# Patient Record
Sex: FEMALE | Race: WHITE | HISPANIC OR LATINO | Employment: UNEMPLOYED | URBAN - METROPOLITAN AREA
[De-identification: names, ages, dates, MRNs, and addresses within clinical notes are randomized per-mention and may not be internally consistent; named-entity substitution may affect disease eponyms.]

---

## 2017-07-27 ENCOUNTER — HOSPITAL ENCOUNTER (EMERGENCY)
Facility: HOSPITAL | Age: 32
Discharge: HOME | End: 2017-07-27
Attending: FAMILY MEDICINE
Payer: MEDICAID

## 2017-07-27 VITALS
DIASTOLIC BLOOD PRESSURE: 78 MMHG | HEART RATE: 87 BPM | HEIGHT: 59 IN | RESPIRATION RATE: 18 BRPM | SYSTOLIC BLOOD PRESSURE: 130 MMHG | TEMPERATURE: 98 F | WEIGHT: 128.06 LBS | OXYGEN SATURATION: 98 % | BODY MASS INDEX: 25.82 KG/M2

## 2017-07-27 DIAGNOSIS — O20.9 VAGINAL BLEEDING IN PREGNANCY, FIRST TRIMESTER: Primary | ICD-10-CM

## 2017-07-27 DIAGNOSIS — N39.0 URINARY TRACT INFECTION WITH HEMATURIA, SITE UNSPECIFIED: ICD-10-CM

## 2017-07-27 DIAGNOSIS — O20.0 THREATENED ABORTION: ICD-10-CM

## 2017-07-27 DIAGNOSIS — O23.41 UTI (URINARY TRACT INFECTION) IN PREGNANCY IN FIRST TRIMESTER: ICD-10-CM

## 2017-07-27 DIAGNOSIS — R31.9 URINARY TRACT INFECTION WITH HEMATURIA, SITE UNSPECIFIED: ICD-10-CM

## 2017-07-27 LAB
ALBUMIN SERPL BCP-MCNC: 4.2 G/DL
ALP SERPL-CCNC: 99 U/L
ALT SERPL W/O P-5'-P-CCNC: 19 U/L
ANION GAP SERPL CALC-SCNC: 12 MMOL/L
AST SERPL-CCNC: 16 U/L
B-HCG UR QL: POSITIVE
BACTERIA #/AREA URNS AUTO: ABNORMAL /HPF
BACTERIA GENITAL QL WET PREP: ABNORMAL
BASOPHILS # BLD AUTO: 0.02 K/UL
BASOPHILS NFR BLD: 0.2 %
BILIRUB SERPL-MCNC: 0.4 MG/DL
BILIRUB UR QL STRIP: NEGATIVE
BUN SERPL-MCNC: 8 MG/DL
C TRACH DNA SPEC QL NAA+PROBE: NOT DETECTED
CALCIUM SERPL-MCNC: 9.3 MG/DL
CHLORIDE SERPL-SCNC: 102 MMOL/L
CLARITY UR REFRACT.AUTO: ABNORMAL
CLUE CELLS VAG QL WET PREP: ABNORMAL
CO2 SERPL-SCNC: 21 MMOL/L
COLOR UR AUTO: YELLOW
CREAT SERPL-MCNC: 0.7 MG/DL
CTP QC/QA: YES
DIFFERENTIAL METHOD: ABNORMAL
EOSINOPHIL # BLD AUTO: 0 K/UL
EOSINOPHIL NFR BLD: 0.3 %
ERYTHROCYTE [DISTWIDTH] IN BLOOD BY AUTOMATED COUNT: 14.1 %
EST. GFR  (AFRICAN AMERICAN): >60 ML/MIN/1.73 M^2
EST. GFR  (NON AFRICAN AMERICAN): >60 ML/MIN/1.73 M^2
FILAMENT FUNGI VAG WET PREP-#/AREA: ABNORMAL
GLUCOSE SERPL-MCNC: 96 MG/DL
GLUCOSE UR QL STRIP: NEGATIVE
HCG INTACT+B SERPL-ACNC: 2888 MIU/ML
HCT VFR BLD AUTO: 41.2 %
HGB BLD-MCNC: 13.7 G/DL
HGB UR QL STRIP: ABNORMAL
KETONES UR QL STRIP: NEGATIVE
LEUKOCYTE ESTERASE UR QL STRIP: ABNORMAL
LYMPHOCYTES # BLD AUTO: 1.9 K/UL
LYMPHOCYTES NFR BLD: 14.3 %
MCH RBC QN AUTO: 26.6 PG
MCHC RBC AUTO-ENTMCNC: 33.3 G/DL
MCV RBC AUTO: 80 FL
MICROSCOPIC COMMENT: ABNORMAL
MONOCYTES # BLD AUTO: 0.6 K/UL
MONOCYTES NFR BLD: 4.9 %
N GONORRHOEA DNA SPEC QL NAA+PROBE: NOT DETECTED
NEUTROPHILS # BLD AUTO: 10.6 K/UL
NEUTROPHILS NFR BLD: 80 %
NITRITE UR QL STRIP: NEGATIVE
PH UR STRIP: 5 [PH] (ref 5–8)
PLATELET # BLD AUTO: 326 K/UL
PMV BLD AUTO: 11 FL
POTASSIUM SERPL-SCNC: 3.5 MMOL/L
PROT SERPL-MCNC: 8.4 G/DL
PROT UR QL STRIP: NEGATIVE
RBC # BLD AUTO: 5.15 M/UL
RBC #/AREA URNS AUTO: 4 /HPF (ref 0–4)
SODIUM SERPL-SCNC: 135 MMOL/L
SP GR UR STRIP: 1.01 (ref 1–1.03)
SPECIMEN SOURCE: ABNORMAL
SQUAMOUS #/AREA URNS AUTO: 5 /HPF
T VAGINALIS GENITAL QL WET PREP: ABNORMAL
URN SPEC COLLECT METH UR: ABNORMAL
UROBILINOGEN UR STRIP-ACNC: NEGATIVE EU/DL
WBC # BLD AUTO: 13.19 K/UL
WBC #/AREA URNS AUTO: 37 /HPF (ref 0–5)
WBC #/AREA VAG WET PREP: ABNORMAL
YEAST GENITAL QL WET PREP: ABNORMAL

## 2017-07-27 PROCEDURE — 87210 SMEAR WET MOUNT SALINE/INK: CPT

## 2017-07-27 PROCEDURE — 81001 URINALYSIS AUTO W/SCOPE: CPT

## 2017-07-27 PROCEDURE — 99284 EMERGENCY DEPT VISIT MOD MDM: CPT | Mod: ,,, | Performed by: PHYSICIAN ASSISTANT

## 2017-07-27 PROCEDURE — 85025 COMPLETE CBC W/AUTO DIFF WBC: CPT

## 2017-07-27 PROCEDURE — 81025 URINE PREGNANCY TEST: CPT | Performed by: PHYSICIAN ASSISTANT

## 2017-07-27 PROCEDURE — 96360 HYDRATION IV INFUSION INIT: CPT

## 2017-07-27 PROCEDURE — 80053 COMPREHEN METABOLIC PANEL: CPT

## 2017-07-27 PROCEDURE — 25000003 PHARM REV CODE 250: Performed by: PHYSICIAN ASSISTANT

## 2017-07-27 PROCEDURE — 99284 EMERGENCY DEPT VISIT MOD MDM: CPT | Mod: 25

## 2017-07-27 PROCEDURE — 84702 CHORIONIC GONADOTROPIN TEST: CPT

## 2017-07-27 PROCEDURE — 96361 HYDRATE IV INFUSION ADD-ON: CPT

## 2017-07-27 PROCEDURE — 87591 N.GONORRHOEAE DNA AMP PROB: CPT

## 2017-07-27 PROCEDURE — 87086 URINE CULTURE/COLONY COUNT: CPT

## 2017-07-27 RX ORDER — NITROFURANTOIN 25; 75 MG/1; MG/1
100 CAPSULE ORAL
Status: COMPLETED | OUTPATIENT
Start: 2017-07-27 | End: 2017-07-27

## 2017-07-27 RX ORDER — ACETAMINOPHEN 325 MG/1
650 TABLET ORAL
Status: COMPLETED | OUTPATIENT
Start: 2017-07-27 | End: 2017-07-27

## 2017-07-27 RX ORDER — NITROFURANTOIN 25; 75 MG/1; MG/1
100 CAPSULE ORAL 2 TIMES DAILY
Qty: 14 CAPSULE | Refills: 0 | Status: SHIPPED | OUTPATIENT
Start: 2017-07-27 | End: 2017-08-03

## 2017-07-27 RX ADMIN — NITROFURANTOIN (MONOHYDRATE/MACROCRYSTALS) 100 MG: 75; 25 CAPSULE ORAL at 02:07

## 2017-07-27 RX ADMIN — SODIUM CHLORIDE 1000 ML: 0.9 INJECTION, SOLUTION INTRAVENOUS at 12:07

## 2017-07-27 RX ADMIN — ACETAMINOPHEN 650 MG: 325 TABLET ORAL at 12:07

## 2017-07-27 NOTE — DISCHARGE INSTRUCTIONS
Follow up with a doctor in 2 days (7/29/17) for a repeat pregnancy hormone level. Your test shows that you are pregnant. Bleeding in early pregnancy can be normal or it can be a sign of miscarriage. It is important that you follow up with your OBGYN in 2 days for further evaluation. Return to the ER right away if you have fever, severe pain, severe bleeding (more than 1 pad every hour), lightheadedness, dizziness or any new or concerning symptoms.    Above in Stateless Translation: Seguimiento con un médico en 2 días (7/29/17) para un nivel de repetición de la hormona del embarazo. Ash prueba muestra que está embarazada. El sangrado en el embarazo temprano puede ser normal o puede ser un signo de aborto. Es importante que realice el seguimiento con ash OBGYN en 2 días para rome evaluación posterior. Vuelva al ER de inmediato si tiene fiebre, dolor intenso, sangrado severo (más de 1 almohadilla cada hora), aturdimiento, mareos o cualquier síntoma nuevo o relacionado.

## 2017-07-27 NOTE — ED TRIAGE NOTES
Jazlyn Martínez, a 31 y.o. female presents to the ED c/o vaginal bleeding since yesterday and + UPT.       Chief Complaint   Patient presents with    Vaginal Bleeding     Pt reports vaginal bleeding that began yesterday.  (+) cramping.  LMP 6/12, had (+) UPT     Review of patient's allergies indicates:  No Known Allergies  History reviewed. No pertinent past medical history.    LOC: Patient name and date of birth verified. The patient is awake, alert and aware of environment with an appropriate affect, the patient is oriented x 3 and speaking appropriately.   APPEARANCE: Patient resting comfortably, patient is clean and well groomed, patient's clothing is properly fastened.  SKIN: The skin is warm and dry, color consistent with ethnicity, patient has normal skin turgor and moist mucus membranes, skin intact, no breakdown or bruising noted.  MUSCULOSKELETAL: Patient moving all extremities well, no obvious swelling or deformities noted.   RESPIRATORY: Respirations are spontaneous, patient has a normal effort and rate, no accessory muscle use noted.  CARDIAC: Patient has a normal rate and rhythm, no periphreal edema noted, capillary refill < 3 seconds.  ABDOMEN: Soft and non tender to palpation, no distention noted. Bowel sounds present in all four quadrants.  NEUROLOGIC: Eyes open spontaneously, behavior appropriate to situation, follows commands, facial expression symmetrical, bilateral hand grasp equal and even, purposeful motor response noted, normal sensation in all extremities when touched with a finger.

## 2017-07-27 NOTE — ED PROVIDER NOTES
Encounter Date: 2017       History     Chief Complaint   Patient presents with    Vaginal Bleeding     Pt reports vaginal bleeding that began yesterday.  (+) cramping.  LMP , had (+) UPT     This is a 31 year old  female with no known significant PMH who presents to the ED with a chief complaint of abdominal pain and vaginal bleeding. LMP 17. She reports her cycles as regular. She began with lower abdominal and pelvic pain described as 5/10 and cramping in nature. It radiates to the back. She noted scant vaginal spotting without heavy bleeding or clots. She reports a positive home pregnancy test. Patient denies fever, chills, chest pain, SOB, nausea/vomiting, dysuria or additional complaints.      The history is provided by the patient. The history is limited by a language barrier. A  was used.     Review of patient's allergies indicates:  No Known Allergies  History reviewed. No pertinent past medical history.  History reviewed. No pertinent surgical history.  History reviewed. No pertinent family history.  Social History   Substance Use Topics    Smoking status: Not on file    Smokeless tobacco: Not on file    Alcohol use Not on file     Review of Systems   Constitutional: Negative for chills and fever.   HENT: Negative for sore throat.    Respiratory: Negative for shortness of breath.    Cardiovascular: Negative for chest pain.   Gastrointestinal: Negative for abdominal pain, nausea and vomiting.   Genitourinary: Positive for menstrual problem, pelvic pain and vaginal bleeding. Negative for dysuria.   Musculoskeletal: Positive for back pain.   Skin: Negative for rash.   Neurological: Negative for weakness.   Hematological: Does not bruise/bleed easily.       Physical Exam     Initial Vitals [17 1032]   BP Pulse Resp Temp SpO2   128/81 93 16 97.2 °F (36.2 °C) --      MAP       96.67         Physical Exam    Constitutional: She appears well-developed and  well-nourished. No distress.   HENT:   Head: Atraumatic.   Eyes: Conjunctivae and EOM are normal. Pupils are equal, round, and reactive to light.   Cardiovascular: Normal rate, regular rhythm and normal heart sounds.   Pulmonary/Chest: Breath sounds normal. No respiratory distress. She has no wheezes. She has no rhonchi. She has no rales.   Abdominal: Soft. Bowel sounds are normal. There is tenderness in the suprapubic area. There is no rebound and no guarding.   Genitourinary: Cervix exhibits friability. Cervix exhibits no motion tenderness. Right adnexum displays tenderness. Left adnexum displays tenderness.   Genitourinary Comments: Moderate vaginal bleeding. Cervix is closed.   Neurological: She is alert and oriented to person, place, and time.   Skin: Skin is warm and dry. No rash noted.         ED Course   Procedures  Labs Reviewed   CBC W/ AUTO DIFFERENTIAL - Abnormal; Notable for the following:        Result Value    WBC 13.19 (*)     MCV 80 (*)     MCH 26.6 (*)     Gran # 10.6 (*)     Gran% 80.0 (*)     Lymph% 14.3 (*)     All other components within normal limits   COMPREHENSIVE METABOLIC PANEL - Abnormal; Notable for the following:     Sodium 135 (*)     CO2 21 (*)     All other components within normal limits   URINALYSIS, REFLEX TO URINE CULTURE - Abnormal; Notable for the following:     Appearance, UA Hazy (*)     Occult Blood UA 3+ (*)     Leukocytes, UA 1+ (*)     All other components within normal limits    Narrative:     Preferred Collection Type->Urine, Clean Catch   VAGINAL SCREEN - Abnormal; Notable for the following:     Clue Cells, Wet Prep Few (*)     WBC - Vaginal Screen Rare (*)     Bacteria - Vaginal Screen Many (*)     All other components within normal limits   URINALYSIS MICROSCOPIC - Abnormal; Notable for the following:     WBC, UA 37 (*)     Bacteria, UA Few (*)     All other components within normal limits    Narrative:     Preferred Collection Type->Urine, Clean Catch   POCT URINE  PREGNANCY - Abnormal; Notable for the following:     POC Preg Test, Ur Positive (*)     All other components within normal limits   C. TRACHOMATIS/N. GONORRHOEAE BY AMP DNA   CULTURE, URINE   HCG, QUANTITATIVE, PREGNANCY   GROUP & RH         Imaging Results          US OB Less Than 14 Wks with Transvag(xpd (Final result)  Result time 07/27/17 13:46:36    Final result by Wyatt Patel III, MD (07/27/17 13:46:36)                 Impression:        Single intrauterine pregnancy age 5 weeks, 3 days without complication. Continued ultrasound followup recommended.  ______________________________________     Electronically signed by resident: ANGEL LIMA MD  Date:     07/27/17  Time:    13:33            As the supervising and teaching physician, I personally reviewed the images and resident's interpretation and I agree with the findings.            Electronically signed by: WYATT PATEL  Date:     07/27/17  Time:    13:46              Narrative:    Comparison: None.    Indication: Vaginal bleeding    Technique: Transabdominal and transvaginal sonographic evaluation of pelvic organs was performed using grayscale, color flow and spectral analysis.    Findings:    LMP: 06/12/17    The uterus is enlarged in size measuring 4.5 x 5.4 x 11.0 cm. The endometrium is thickened and a gestational sac is identified measuring 0.89 x 0.76 x 0.55 for a mean sac diameter of 0.75 cm3. The crown-rump length measures 2.63 mm.     A heartbeat is identified measuring 85 beats per minute.    The right ovary is normal in appearance and measures 2.8 x 2.5 x 1.9 cm with a resistive index of 0.83 and normal arterial and venous blood flow.  The left ovary measures 3.0 x 2.5 x 1.2 cm with good blood flow and resistive index of 0.43. No corpus luteum is identified.                                         APC / Resident Notes:   31-year-old Uzbek-speaking female presents with pelvic pain, vaginal bleeding, and a positive home pregnancy  test.  On exam she is afebrile and nontoxic.  Abdomen is soft with mild lower abdominal and suprapubic tenderness.  Pelvic exam demonstrates a moderate amount of vaginal bleeding and a closed cervix.    DDX includes but is not limited to threatened , ectopic pregnancy, dysfunctional uterine bleeding, UTI.    Urine pregnancy test is positive.  Labs demonstrate CBC 13.19, stable H&H 13/41, hCG 2888.  Urinalysis is nitrite negative with 1+ leukocytes, 37 WBCs.  Will start Macrobid pending culture.  OB ultrasound demonstrates a single intrauterine pregnancy dated at 5 weeks and 3 days.  Fetal heart tones 85 bpm.  Utilizing the language line with a professional  I advised the patient of these findings and that they may represent bleeding in early normal pregnancy or an early threatened miscarriage.  I have recommended that the patient follow-up in 2 days for serial hCG.  I have referred her to the OB/GYN clinic and given her detailed return to ED precautions.  The patient verbalizes understanding and all questions were answered to the patient's satisfaction.  Her pain is improved with Tylenol.  She has stable vitals and is stable for outpatient management. I discussed the care of this patient with my supervising MD.               ED Course     Clinical Impression:   The primary encounter diagnosis was Vaginal bleeding in pregnancy, first trimester. Diagnoses of Threatened  and Urinary tract infection with hematuria, site unspecified were also pertinent to this visit.    Disposition:   Disposition: Discharged  Condition: Stable                        EMERITA Walker  17

## 2017-07-28 LAB — BACTERIA UR CULT: NO GROWTH

## 2017-07-29 ENCOUNTER — HOSPITAL ENCOUNTER (EMERGENCY)
Facility: HOSPITAL | Age: 32
Discharge: HOME | End: 2017-07-29
Attending: EMERGENCY MEDICINE
Payer: MEDICAID

## 2017-07-29 VITALS
HEART RATE: 88 BPM | WEIGHT: 127.88 LBS | HEIGHT: 60 IN | RESPIRATION RATE: 20 BRPM | OXYGEN SATURATION: 100 % | TEMPERATURE: 98 F | SYSTOLIC BLOOD PRESSURE: 124 MMHG | DIASTOLIC BLOOD PRESSURE: 68 MMHG | BODY MASS INDEX: 25.11 KG/M2

## 2017-07-29 DIAGNOSIS — O03.9 ABORTION: Primary | ICD-10-CM

## 2017-07-29 DIAGNOSIS — O03.9 COMPLETE ABORTION: ICD-10-CM

## 2017-07-29 DIAGNOSIS — N93.9 VAGINAL BLEEDING: ICD-10-CM

## 2017-07-29 LAB
ABO + RH BLD: NORMAL
BASOPHILS # BLD AUTO: 0.02 K/UL
BASOPHILS NFR BLD: 0.3 %
DIFFERENTIAL METHOD: ABNORMAL
EOSINOPHIL # BLD AUTO: 0.1 K/UL
EOSINOPHIL NFR BLD: 1.3 %
ERYTHROCYTE [DISTWIDTH] IN BLOOD BY AUTOMATED COUNT: 14.2 %
HCG INTACT+B SERPL-ACNC: 359 MIU/ML
HCT VFR BLD AUTO: 38.6 %
HGB BLD-MCNC: 12.9 G/DL
LYMPHOCYTES # BLD AUTO: 2.2 K/UL
LYMPHOCYTES NFR BLD: 28.3 %
MCH RBC QN AUTO: 26.8 PG
MCHC RBC AUTO-ENTMCNC: 33.4 G/DL
MCV RBC AUTO: 80 FL
MONOCYTES # BLD AUTO: 0.6 K/UL
MONOCYTES NFR BLD: 7.5 %
NEUTROPHILS # BLD AUTO: 4.8 K/UL
NEUTROPHILS NFR BLD: 62.5 %
PLATELET # BLD AUTO: 316 K/UL
PMV BLD AUTO: 10.5 FL
RBC # BLD AUTO: 4.82 M/UL
WBC # BLD AUTO: 7.6 K/UL

## 2017-07-29 PROCEDURE — 86901 BLOOD TYPING SEROLOGIC RH(D): CPT

## 2017-07-29 PROCEDURE — 25000003 PHARM REV CODE 250: Performed by: PHYSICIAN ASSISTANT

## 2017-07-29 PROCEDURE — 86900 BLOOD TYPING SEROLOGIC ABO: CPT

## 2017-07-29 PROCEDURE — 99284 EMERGENCY DEPT VISIT MOD MDM: CPT | Mod: 25

## 2017-07-29 PROCEDURE — 85025 COMPLETE CBC W/AUTO DIFF WBC: CPT

## 2017-07-29 PROCEDURE — 84702 CHORIONIC GONADOTROPIN TEST: CPT

## 2017-07-29 PROCEDURE — 99285 EMERGENCY DEPT VISIT HI MDM: CPT | Mod: ,,, | Performed by: EMERGENCY MEDICINE

## 2017-07-29 RX ORDER — ACETAMINOPHEN 325 MG/1
650 TABLET ORAL
Status: COMPLETED | OUTPATIENT
Start: 2017-07-29 | End: 2017-07-29

## 2017-07-29 RX ADMIN — ACETAMINOPHEN 650 MG: 325 TABLET ORAL at 10:07

## 2017-07-29 NOTE — ED TRIAGE NOTES
Seen here 2 days ago with a positive UPT.  Returning today for follow up from here visit 2 days ago.  Patient states that she feels better.

## 2017-07-29 NOTE — PROGRESS NOTES
Called by PA in ED regarding Ms. Smith. Pt is a 30 yo  who was seen in the ER 2 days ago for vaginal bleeding and a positive pregnancy test. Beta hcg at this time was > 2000, and ultrasound confirmed IUP. Pt returned today for follow up bloodwork: beta hcg now 356. Pt is hemodynamically stable and bleeding has decreased in severity. Type and screen was performed: pt is B+ - no need for rhogam. Ultrasound reviewed - shows no evidence of retained products of conception. Pt will need to follow up to trend betahcg levels to <5. Message sent to [facility] resident [facility] to set up an appointment, and patient was given phone number to clinic.    Loi Verdugo MD  PGY1, OBGYN  [facility]

## 2017-07-29 NOTE — ED PROVIDER NOTES
Encounter Date: 7/29/2017    SCRIBE #1 NOTE: I, Arleth Cho, am scribing for, and in the presence of,  Dr. Lloyd. I have scribed the following portions of the note - the APC attestation.       History     Chief Complaint   Patient presents with    Female  Problem     here to draw [facility]     Patient is a 31-year-old year old  speaking female that presents to ED for repeat blood work and vaginal bleeding in pregnancy.  Patient does not speak English and is comfortable with our nursing a .  She was seen 2 days ago in the ED and had an IUP confirmed with ultrasound.  She presents today because she states that she was instructed to repeat blood work in 2 days.  She reports improvement in vaginal bleeding but still constant.  Consistency and texture of blood unchanged.  She continues to endorse her lower abdominal pain that is 8/10.  No fever or chills.  She did not try to call an OB/GYN yet.          Review of patient's allergies indicates:  No Known Allergies  History reviewed. No pertinent past medical history.  History reviewed. No pertinent surgical history.  Family History   Problem Relation Age of Onset    Diabetes Mother      Social History   Substance Use Topics    Smoking status: Never Smoker    Smokeless tobacco: Never Used    Alcohol use No     Review of Systems    Physical Exam     Initial Vitals   BP Pulse Resp Temp SpO2   07/29/17 0824 07/29/17 0824 07/29/17 0824 07/29/17 0824 07/29/17 1004   137/81 92 16 98.5 °F (36.9 °C) 100 %      MAP       07/29/17 0824       99.67         Physical Exam    Vitals reviewed.  Constitutional: She appears well-developed and well-nourished. She is not diaphoretic. No distress.   HENT:   Head: Normocephalic and atraumatic.   Nose: Nose normal.   Eyes: Conjunctivae and EOM are normal.   Neck: Normal range of motion.   Cardiovascular: Normal rate, regular rhythm and normal heart sounds. Exam reveals no friction rub.    No murmur  heard.  Pulmonary/Chest: Breath sounds normal. No respiratory distress. She has no wheezes. She has no rales.   Abdominal: Soft. Bowel sounds are normal. She exhibits no distension. There is tenderness in the suprapubic area. There is no rebound.   Genitourinary: Rectum normal. Uterus is tender. Cervix exhibits no friability. Right adnexum displays tenderness. Left adnexum displays tenderness. There is bleeding (mild amount of blood; no clots or POC noted) in the vagina.   Genitourinary Comments: Os closed.   Musculoskeletal: Normal range of motion.   Neurological: She is alert and oriented to person, place, and time. She has normal strength. No sensory deficit.   Skin: Skin is warm and dry. No erythema. No pallor.   Psychiatric: She has a normal mood and affect. Her behavior is normal. Judgment and thought content normal.         ED Course   Procedures  Labs Reviewed   CBC W/ AUTO DIFFERENTIAL - Abnormal; Notable for the following:        Result Value    MCV 80 (*)     MCH 26.8 (*)     All other components within normal limits   HCG, QUANTITATIVE, PREGNANCY   GROUP & RH             Medical Decision Making:   History:   Old Medical Records: I decided to obtain old medical records.  Clinical Tests:   Lab Tests: Ordered and Reviewed  Radiological Study: Ordered and Reviewed    Imaging Results          US OB Less Than 14 Wks First Gestation (Final result)  Result time 17 12:31:55    Final result by David Griggs MD (17 12:31:55)                 Impression:        No intrauterine gestation identified on today's examination which is most compatible with a completed  in this patient with history of vaginal bleeding and decreasing beta hCG. Clinical correlation advised.  ______________________________________     Electronically signed by resident: OLIMPIA HOLDEN MD  Date:     17  Time:    12:24            As the supervising and teaching physician, I personally reviewed the images and  resident's interpretation and I agree with the findings.          Electronically signed by: Dr. David Griggs MD  Date:     17  Time:    12:31              Narrative:    Obstetric Ultrasound of the pelvis, first trimester    Comparison: Ultrasound pelvis obstetrical 17.    History: Vaginal bleeding.    Beta-hC on 17 and 2888 on the 17.    Findings:    The uterus measures 7.8 x 4.5 x 6.8 cm and the endometrium measures 7 mm in thickness. The right ovary is visualized measuring 3.2 x 1.6 x 2.3 cm and demonstrates internal vascular flow with a resistive index of 0.71. The left ovary is also normal in size measuring 4.0 x 1.5 x 2.2 cm with internal vascular flow and a resistive index of 0.82. No masses are identified and there is no corpus luteum. Compared to the previous examination, no intrauterine gestation is visualized. There is no significant fluid in the adnexa or in the endometrium. The urinary bladder appears unremarkable.                                 APC / Resident Notes:   DDX includes but is not limited to  and bleeding during pregnancy.  Will order labs and reassess.    HCG inappropriately trending down to 359.  Was 2888 two days ago.  H/H stable to 12.9/38.6.    Case discussed with OB/GYN over [employer].  They recommend repeat ultrasound.    12:43 PM  Ultrasound shows no intrauterine gestation which is most compatible with a complete .  OB/GYN updated, and they would like to have patient follow up in clinic for trending beta hCG.  Patient updated with results.  She was advised to follow-up with OB/GYN at [facility].  She was provided with number to call if she does not get a call. I offered her grieving services, but her and  declined.  OTC Tylenol.  Rest.  Stay hydrated by drinking plenty of fluids.  Strict ED return precaution given.  Patient voices understanding.  All questions answered.  Patient is stable for discharge.  I reviewed  patient's chart and discussed this case with my supervising BERTIN Dewitt Attestation:   Scribe #1: I performed the above scribed service and the documentation accurately describes the services I performed. I attest to the accuracy of the note.    Attending Attestation:     Physician Attestation Statement for NP/PA:   I discussed this assessment and plan of this patient with the NP/PA, but I did not personally examine the patient. The face to face encounter was performed by the NP/PA.    Other NP/PA Attestation Additions:      Medical Decision Making: Pt presents with vaginal bleeding and pregnancy. HCG is trending down. White count normal. Discussed with gyn and suspect this is an incomplete . Will follow up in clinic. Doubt surgical abdomen or appendicitis.        Physician Attestation for Scribe:  Physician Attestation Statement for Scribe #1: I, Dr. Lloyd, reviewed documentation, as scribed by Arleth Cho in my presence, and it is both accurate and complete.                 ED Course     Clinical Impression:   The primary encounter diagnosis was . Diagnoses of Vaginal bleeding and Complete  were also pertinent to this visit.    Disposition:   Disposition: Discharged  Condition: Stable                        Jeanette Wallace PA-C  17 [id number]

## 2017-07-29 NOTE — DISCHARGE INSTRUCTIONS
Take over-the-counter acetaminophen as directed for pain relief.  Follow up at [facility].  They will call you with an appointment time.  If you do not hear from them in 1-2 days, call [phone]. You will need to repeat blood work once every week to monitor your lab work.    No future appointments.    Our goal in the emergency department is to always give you outstanding care and exceptional service. You may receive a survey by mail or e-mail in the next week regarding your experience in our ED. We would greatly appreciate your completing and returning the survey. Your feedback provides us with a way to recognize our staff who give very good care and it helps us learn how to improve when your experience was below our aspiration of excellence.

## 2017-07-31 ENCOUNTER — TELEPHONE (OUTPATIENT)
Dept: OBSTETRICS AND GYNECOLOGY | Facility: CLINIC | Age: 32
End: 2017-07-31

## 2017-07-31 DIAGNOSIS — O02.1 MISSED AB: Primary | ICD-10-CM

## 2017-07-31 NOTE — TELEPHONE ENCOUNTER
----- Message from Renee Subramanian RN sent at 7/31/2017 12:24 PM CDT -----  Tresa,    This patient needs a repeat Hcg Level. Patient was seen on 7/27/17 and 7/29/17 in the ED for bleeding. Based on the last ED Note, a repeat US was performed per OB/GYN Consult and Hcg was drawn. As of 7/29/17, the Hcg level has dropped and the US showed no intrauterine gestation.    Kwanza- Once order is placed, please have patient go to lab to have labs drawn.    Thanks,    Renee Subramanian RN

## 2017-07-31 NOTE — TELEPHONE ENCOUNTER
Called pt and scheduled lab and clinic appts with the assistance of an . Faxed request for  form to the [employer] for appts on 08/03/2017.

## 2017-07-31 NOTE — TELEPHONE ENCOUNTER
----- Message from Loi Verdugo MD sent at 7/29/2017 11:48 AM CDT -----  Please call patient to set up follow up appointment at clinic. Was seen in ER at [facility] on 7/27 with vaginal bleeding (US showed +IUP, beta hcg >2000) Repeat visit on 7/29, repeat beta 350. Pt does not have a primary doc/OBGYN.     Thank you!

## 2017-07-31 NOTE — TELEPHONE ENCOUNTER
Pt Medicaid is pending. Pt doesn't speak English nor is anyone there at this time that does. Waiting for .

## 2017-08-02 ENCOUNTER — TELEPHONE (OUTPATIENT)
Dept: OBSTETRICS AND GYNECOLOGY | Facility: CLINIC | Age: 32
End: 2017-08-02

## 2017-08-02 NOTE — TELEPHONE ENCOUNTER
----- Message from Renee Subramanian RN sent at 7/31/2017 12:37 PM CDT -----  Gonzalo,    I spoke to Tresa again. Please reach out to patient and schedule and appointment with Tresa for this week. Tresa has a 3pm available this Thursday. The patient can get her labs drawn before and have an exam done after. If the patient really wants to be seen today, you can put her on the [facility] schedule for this afternoon.    Thanks,    Renee  ----- Message -----  From: Renee Subramanian RN  Sent: 7/31/2017  12:24 PM  To: Linda Crump LPN, #    Tresa,    This patient needs a repeat Hcg Level. Patient was seen on 7/27/17 and 7/29/17 in the ED for bleeding. Based on the last ED Note, a repeat US was performed per OB/GYN Consult and Hcg was drawn. As of 7/29/17, the Hcg level has dropped and the US showed no intrauterine gestation.    Gonzalo- Once order is placed, please have patient go to lab to have labs drawn.    Thanks,    Renee Subramanian RN

## 2017-08-02 NOTE — TELEPHONE ENCOUNTER
Left message to patient to call the office at [phone] to inform of appointment with np and schedule hcg levels.

## 2017-08-03 ENCOUNTER — LAB VISIT (OUTPATIENT)
Dept: LAB | Facility: OTHER | Age: 32
End: 2017-08-03
Attending: NURSE PRACTITIONER
Payer: MEDICAID

## 2017-08-03 ENCOUNTER — OFFICE VISIT (OUTPATIENT)
Dept: OBSTETRICS AND GYNECOLOGY | Facility: CLINIC | Age: 32
End: 2017-08-03
Payer: MEDICAID

## 2017-08-03 VITALS
DIASTOLIC BLOOD PRESSURE: 68 MMHG | BODY MASS INDEX: 24.58 KG/M2 | SYSTOLIC BLOOD PRESSURE: 110 MMHG | WEIGHT: 125.88 LBS

## 2017-08-03 DIAGNOSIS — O02.1 MISSED AB: ICD-10-CM

## 2017-08-03 DIAGNOSIS — O02.1 MISSED AB: Primary | ICD-10-CM

## 2017-08-03 LAB — HCG INTACT+B SERPL-ACNC: 24 MIU/ML

## 2017-08-03 PROCEDURE — 99999 PR PBB SHADOW E&M-EST. PATIENT-LVL II: CPT | Mod: PBBFAC,,, | Performed by: NURSE PRACTITIONER

## 2017-08-03 PROCEDURE — 99212 OFFICE O/P EST SF 10 MIN: CPT | Mod: PBBFAC | Performed by: NURSE PRACTITIONER

## 2017-08-03 PROCEDURE — 99203 OFFICE O/P NEW LOW 30 MIN: CPT | Mod: TH,S$PBB,, | Performed by: NURSE PRACTITIONER

## 2017-08-03 RX ORDER — NAPROXEN SODIUM 550 MG/1
550 TABLET ORAL 2 TIMES DAILY WITH MEALS
Qty: 30 TABLET | Refills: 3 | Status: SHIPPED | OUTPATIENT
Start: 2017-08-03 | End: 2017-08-18

## 2017-08-03 NOTE — PROGRESS NOTES
CC: Missed     HPI: Pt is a 31 y.o.  female who presents for complaining of  A miscarriage.   at bedside.   She went to the ED on 17 with vaginal bleeding.  The vaginal bleeding continued and she went to the ER again on 17 and US revealed a complete .  Reports cessation of vaginal bleeding has occurred.  Repots she is still experiencing some cramping, back pain, and HAs.          ROS:  GENERAL: Feeling well overall. Denies fever or chills.   SKIN: Denies rash or lesions.   HEAD: Denies head injury or headache.   NODES: Denies enlarged lymph nodes.   CHEST: Denies chest pain or shortness of breath.   CARDIOVASCULAR: Denies palpitations or left sided chest pain.   ABDOMEN: No abdominal pain, constipation, diarrhea, nausea, vomiting or rectal bleeding.   URINARY: No dysuria, hematuria, or burning on urination.  REPRODUCTIVE: See HPI.   BREASTS: Denies pain, lumps, or nipple discharge.   HEMATOLOGIC: No easy bruisability or excessive bleeding.   MUSCULOSKELETAL: Denies joint pain or swelling.   NEUROLOGIC: Denies syncope or weakness.   PSYCHIATRIC: Denies depression, anxiety or mood swings.    PE:   APPEARANCE: Well nourished, well developed, Black or  female in no acute distress.  VULVA: No lesions. Normal external female genitalia.  URETHRAL MEATUS: Normal size and location, no lesions, no prolapse.  URETHRA: No masses, tenderness, or prolapse.  VAGINA: Moist. No lesions or lacerations noted. No abnormal discharge present. No odor present.   CERVIX: No lesions or discharge. No cervical motion tenderness.   UTERUS: Normal size, regular shape, mobile, non-tender.  ADNEXA: No tenderness. No fullness or masses palpated in the adnexal regions.   ANUS PERINEUM: Normal.        Diagnosis:  1. Missed ab        Plan:     Orders Placed This Encounter    hCG, quantitative    naproxen sodium (ANAPROX) 550 MG tablet          Follow up hCG weekly until negative    Discussed to call and RTC if  Has a Fever above 100.4°F or chills, bright red vaginal bleeding that soaks more than 1 pad per hour, or a foul smelling discharge  Discussed Naproxen PRN discomfort.      Tresa Bass, BRENNENP-C

## 2017-08-10 ENCOUNTER — LAB VISIT (OUTPATIENT)
Dept: LAB | Facility: HOSPITAL | Age: 32
End: 2017-08-10
Attending: NURSE PRACTITIONER
Payer: MEDICAID

## 2017-08-10 ENCOUNTER — TELEPHONE (OUTPATIENT)
Dept: OBSTETRICS AND GYNECOLOGY | Facility: CLINIC | Age: 32
End: 2017-08-10

## 2017-08-10 DIAGNOSIS — O02.1 MISSED AB: ICD-10-CM

## 2017-08-10 LAB — HCG INTACT+B SERPL-ACNC: 2.7 MIU/ML

## 2017-08-10 PROCEDURE — 36415 COLL VENOUS BLD VENIPUNCTURE: CPT

## 2017-08-10 PROCEDURE — 84702 CHORIONIC GONADOTROPIN TEST: CPT

## 2017-08-10 NOTE — TELEPHONE ENCOUNTER
lest detailed message stating pt's hcg level is now below 5 - consistent with a pre-pregnancy state.  She no longer needs to have the hCG levels drawn.

## 2017-08-16 ENCOUNTER — DOCUMENTATION ONLY (OUTPATIENT)
Dept: GYNECOLOGY | Facility: OTHER | Age: 32
End: 2017-08-16

## 2017-08-16 NOTE — PROGRESS NOTES
b-hcg has been confirmed < 5. Level was 2.7 on 8/10/2017. Patient is being removed from the beta book today.    Kelly Fernandez MD, PhD  OBGYN, PGY-2

## 2018-10-17 PROBLEM — Z34.93 ENCOUNTER FOR SUPERVISION OF NORMAL PREGNANCY IN THIRD TRIMESTER: Status: ACTIVE | Noted: 2018-10-10

## 2018-10-28 ENCOUNTER — HOSPITAL ENCOUNTER (INPATIENT)
Facility: HOSPITAL | Age: 33
LOS: 2 days | Discharge: HOME | End: 2018-10-30
Attending: OBSTETRICS & GYNECOLOGY | Admitting: OBSTETRICS & GYNECOLOGY
Payer: MEDICAID

## 2018-10-28 ENCOUNTER — ANESTHESIA (OUTPATIENT)
Dept: OBSTETRICS AND GYNECOLOGY | Facility: HOSPITAL | Age: 33
End: 2018-10-28
Payer: MEDICAID

## 2018-10-28 ENCOUNTER — ANESTHESIA EVENT (OUTPATIENT)
Dept: OBSTETRICS AND GYNECOLOGY | Facility: HOSPITAL | Age: 33
End: 2018-10-28
Payer: MEDICAID

## 2018-10-28 LAB
ABO + RH BLD: NORMAL
BASOPHILS # BLD AUTO: 0.01 K/UL
BASOPHILS NFR BLD: 0.1 %
BLD GP AB SCN CELLS X3 SERPL QL: NORMAL
DIFFERENTIAL METHOD: ABNORMAL
EOSINOPHIL # BLD AUTO: 0 K/UL
EOSINOPHIL NFR BLD: 0.2 %
ERYTHROCYTE [DISTWIDTH] IN BLOOD BY AUTOMATED COUNT: 17.3 %
HCT VFR BLD AUTO: 37.4 %
HGB BLD-MCNC: 12.5 G/DL
LYMPHOCYTES # BLD AUTO: 2.5 K/UL
LYMPHOCYTES NFR BLD: 17.5 %
MCH RBC QN AUTO: 25.9 PG
MCHC RBC AUTO-ENTMCNC: 33.4 G/DL
MCV RBC AUTO: 77 FL
MONOCYTES # BLD AUTO: 1 K/UL
MONOCYTES NFR BLD: 7 %
NEUTROPHILS # BLD AUTO: 10.8 K/UL
NEUTROPHILS NFR BLD: 75.2 %
PLATELET # BLD AUTO: 254 K/UL
PMV BLD AUTO: 12.1 FL
RBC # BLD AUTO: 4.83 M/UL
WBC # BLD AUTO: 14.37 K/UL

## 2018-10-28 PROCEDURE — 25000003 PHARM REV CODE 250: Performed by: OBSTETRICS & GYNECOLOGY

## 2018-10-28 PROCEDURE — 59409 OBSTETRICAL CARE: CPT | Mod: AA,,, | Performed by: ANESTHESIOLOGY

## 2018-10-28 PROCEDURE — 86592 SYPHILIS TEST NON-TREP QUAL: CPT

## 2018-10-28 PROCEDURE — 63600175 PHARM REV CODE 636 W HCPCS: Performed by: OBSTETRICS & GYNECOLOGY

## 2018-10-28 PROCEDURE — 72100002 HC LABOR CARE, 1ST 8 HOURS

## 2018-10-28 PROCEDURE — 63600175 PHARM REV CODE 636 W HCPCS: Performed by: ANESTHESIOLOGY

## 2018-10-28 PROCEDURE — 11000001 HC ACUTE MED/SURG PRIVATE ROOM

## 2018-10-28 PROCEDURE — 62326 NJX INTERLAMINAR LMBR/SAC: CPT | Performed by: ANESTHESIOLOGY

## 2018-10-28 PROCEDURE — 27800517 HC TRAY,EPIDURAL-CONTINUOUS: Performed by: ANESTHESIOLOGY

## 2018-10-28 PROCEDURE — 72200005 HC VAGINAL DELIVERY LEVEL II

## 2018-10-28 PROCEDURE — 25000003 PHARM REV CODE 250: Performed by: ANESTHESIOLOGY

## 2018-10-28 PROCEDURE — 27200710 HC EPIDURAL INFUSION PUMP SET: Performed by: ANESTHESIOLOGY

## 2018-10-28 PROCEDURE — 85025 COMPLETE CBC W/AUTO DIFF WBC: CPT

## 2018-10-28 PROCEDURE — 36415 COLL VENOUS BLD VENIPUNCTURE: CPT

## 2018-10-28 PROCEDURE — 86901 BLOOD TYPING SEROLOGIC RH(D): CPT

## 2018-10-28 PROCEDURE — 51701 INSERT BLADDER CATHETER: CPT

## 2018-10-28 RX ORDER — DIPHENHYDRAMINE HYDROCHLORIDE 50 MG/ML
25 INJECTION INTRAMUSCULAR; INTRAVENOUS EVERY 4 HOURS PRN
Status: DISCONTINUED | OUTPATIENT
Start: 2018-10-28 | End: 2018-10-30 | Stop reason: HOSPADM

## 2018-10-28 RX ORDER — OXYTOCIN/RINGER'S LACTATE 20/1000 ML
41.65 PLASTIC BAG, INJECTION (ML) INTRAVENOUS CONTINUOUS
Status: DISCONTINUED | OUTPATIENT
Start: 2018-10-28 | End: 2018-10-28

## 2018-10-28 RX ORDER — SODIUM CHLORIDE, SODIUM LACTATE, POTASSIUM CHLORIDE, CALCIUM CHLORIDE 600; 310; 30; 20 MG/100ML; MG/100ML; MG/100ML; MG/100ML
INJECTION, SOLUTION INTRAVENOUS CONTINUOUS
Status: DISCONTINUED | OUTPATIENT
Start: 2018-10-28 | End: 2018-10-28

## 2018-10-28 RX ORDER — OXYCODONE AND ACETAMINOPHEN 5; 325 MG/1; MG/1
1 TABLET ORAL EVERY 4 HOURS PRN
Status: DISCONTINUED | OUTPATIENT
Start: 2018-10-28 | End: 2018-10-30 | Stop reason: HOSPADM

## 2018-10-28 RX ORDER — FENTANYL CITRATE 50 UG/ML
INJECTION, SOLUTION INTRAMUSCULAR; INTRAVENOUS
Status: DISCONTINUED | OUTPATIENT
Start: 2018-10-28 | End: 2018-10-28

## 2018-10-28 RX ORDER — OXYTOCIN/RINGER'S LACTATE 20/1000 ML
2 PLASTIC BAG, INJECTION (ML) INTRAVENOUS CONTINUOUS
Status: DISCONTINUED | OUTPATIENT
Start: 2018-10-28 | End: 2018-10-28 | Stop reason: SDUPTHER

## 2018-10-28 RX ORDER — ACETAMINOPHEN 325 MG/1
650 TABLET ORAL EVERY 6 HOURS PRN
Status: DISCONTINUED | OUTPATIENT
Start: 2018-10-28 | End: 2018-10-30 | Stop reason: HOSPADM

## 2018-10-28 RX ORDER — OXYCODONE AND ACETAMINOPHEN 10; 325 MG/1; MG/1
1 TABLET ORAL EVERY 4 HOURS PRN
Status: DISCONTINUED | OUTPATIENT
Start: 2018-10-28 | End: 2018-10-30 | Stop reason: HOSPADM

## 2018-10-28 RX ORDER — MISOPROSTOL 200 UG/1
600 TABLET ORAL
Status: DISCONTINUED | OUTPATIENT
Start: 2018-10-28 | End: 2018-10-30 | Stop reason: HOSPADM

## 2018-10-28 RX ORDER — FENTANYL/BUPIVACAINE/NS/PF 2MCG/ML-.1
PLASTIC BAG, INJECTION (ML) INJECTION CONTINUOUS PRN
Status: DISCONTINUED | OUTPATIENT
Start: 2018-10-28 | End: 2018-10-28

## 2018-10-28 RX ORDER — IBUPROFEN 600 MG/1
600 TABLET ORAL EVERY 6 HOURS PRN
Status: DISCONTINUED | OUTPATIENT
Start: 2018-10-28 | End: 2018-10-30 | Stop reason: HOSPADM

## 2018-10-28 RX ORDER — ONDANSETRON 8 MG/1
8 TABLET, ORALLY DISINTEGRATING ORAL EVERY 8 HOURS PRN
Status: DISCONTINUED | OUTPATIENT
Start: 2018-10-28 | End: 2018-10-28 | Stop reason: SDUPTHER

## 2018-10-28 RX ORDER — BUPIVACAINE HYDROCHLORIDE 2.5 MG/ML
INJECTION, SOLUTION EPIDURAL; INFILTRATION; INTRACAUDAL
Status: DISCONTINUED | OUTPATIENT
Start: 2018-10-28 | End: 2018-10-28

## 2018-10-28 RX ORDER — OXYTOCIN/RINGER'S LACTATE 20/1000 ML
41.7 PLASTIC BAG, INJECTION (ML) INTRAVENOUS CONTINUOUS
Status: DISCONTINUED | OUTPATIENT
Start: 2018-10-28 | End: 2018-10-28 | Stop reason: SDUPTHER

## 2018-10-28 RX ORDER — DIPHENHYDRAMINE HCL 25 MG
25 CAPSULE ORAL EVERY 4 HOURS PRN
Status: DISCONTINUED | OUTPATIENT
Start: 2018-10-28 | End: 2018-10-30 | Stop reason: HOSPADM

## 2018-10-28 RX ORDER — DOCUSATE SODIUM 100 MG/1
200 CAPSULE, LIQUID FILLED ORAL 2 TIMES DAILY PRN
Status: DISCONTINUED | OUTPATIENT
Start: 2018-10-28 | End: 2018-10-30 | Stop reason: HOSPADM

## 2018-10-28 RX ORDER — SODIUM CHLORIDE 9 MG/ML
INJECTION, SOLUTION INTRAVENOUS
Status: DISCONTINUED | OUTPATIENT
Start: 2018-10-28 | End: 2018-10-28

## 2018-10-28 RX ORDER — ONDANSETRON 8 MG/1
8 TABLET, ORALLY DISINTEGRATING ORAL EVERY 8 HOURS PRN
Status: DISCONTINUED | OUTPATIENT
Start: 2018-10-28 | End: 2018-10-30 | Stop reason: HOSPADM

## 2018-10-28 RX ORDER — OXYTOCIN/RINGER'S LACTATE 20/1000 ML
333 PLASTIC BAG, INJECTION (ML) INTRAVENOUS CONTINUOUS
Status: DISPENSED | OUTPATIENT
Start: 2018-10-28 | End: 2018-10-28

## 2018-10-28 RX ADMIN — Medication 2 MILLI-UNITS/MIN: at 09:10

## 2018-10-28 RX ADMIN — IBUPROFEN 600 MG: 600 TABLET ORAL at 08:10

## 2018-10-28 RX ADMIN — BUPIVACAINE HYDROCHLORIDE 6 ML: 2.5 INJECTION, SOLUTION EPIDURAL; INFILTRATION; INTRACAUDAL; PERINEURAL at 07:10

## 2018-10-28 RX ADMIN — OXYCODONE HYDROCHLORIDE AND ACETAMINOPHEN 1 TABLET: 5; 325 TABLET ORAL at 08:10

## 2018-10-28 RX ADMIN — IBUPROFEN 600 MG: 600 TABLET ORAL at 01:10

## 2018-10-28 RX ADMIN — Medication 10 ML/HR: at 07:10

## 2018-10-28 RX ADMIN — SODIUM CHLORIDE, SODIUM LACTATE, POTASSIUM CHLORIDE, AND CALCIUM CHLORIDE: .6; .31; .03; .02 INJECTION, SOLUTION INTRAVENOUS at 07:10

## 2018-10-28 RX ADMIN — OXYCODONE HYDROCHLORIDE AND ACETAMINOPHEN 1 TABLET: 10; 325 TABLET ORAL at 01:10

## 2018-10-28 RX ADMIN — FENTANYL CITRATE 100 MCG: 50 INJECTION INTRAMUSCULAR; INTRAVENOUS at 07:10

## 2018-10-28 RX ADMIN — Medication 41.65 MILLI-UNITS/MIN: at 11:10

## 2018-10-28 NOTE — NURSING
Admit c/o cramping thru out the night.Abd soft and non tender with active fetus.Sve as noted.Transferred to room 217.Report to ,admit orders noted.Hx reviewed.Plan of care reviewed with .

## 2018-10-28 NOTE — PLAN OF CARE
Problem: Labor (Cervical Ripen, Induct, Augment) (Adult,Obstetrics,Pediatric)  Goal: Signs and Symptoms of Listed Potential Problems Will be Absent, Minimized or Managed (Labor)  Signs and symptoms of listed potential problems will be absent, minimized or managed by discharge/transition of care (reference Labor (Cervical Ripen, Induct, Augment) (Adult,Obstetrics,Pediatric) [facility]).  Epidural per Malcom Boudreaux with relief.Positioned left lat.Reports relief of pain

## 2018-10-28 NOTE — ANESTHESIA PREPROCEDURE EVALUATION
10/28/2018  Jazlyn Martínez is a 32 y.o., female.    Anesthesia Evaluation         Review of Systems  Anesthesia Hx:  No previous Anesthesia   Social:  Non-Smoker    Hematology/Oncology:  Hematology Normal   Oncology Normal     EENT/Dental:EENT/Dental Normal   Cardiovascular:  Cardiovascular Normal     Pulmonary:  Pulmonary Normal    Renal/:  Renal/ Normal     Hepatic/GI:  Hepatic/GI Normal    Musculoskeletal:  Musculoskeletal Normal    Neurological:  Neurology Normal    Endocrine:  Endocrine Normal    Dermatological:  Skin Normal    Psych:  Psychiatric Normal           Physical Exam   Airway/Jaw/Neck:  Airway Findings: Mallampati: II     Dental:  Dental Findings: In tact             Anesthesia Plan  Type of Anesthesia, risks & benefits discussed:  Anesthesia Type:  epidural  Patient's Preference:   Intra-op Monitoring Plan: standard ASA monitors  Intra-op Monitoring Plan Comments:   Post Op Pain Control Plan: multimodal analgesia, IV/PO Opioids PRN and per primary service following discharge from PACU  Post Op Pain Control Plan Comments:   Induction:    Beta Blocker:  Patient is not currently on a Beta-Blocker (No further documentation required).       Informed Consent: Patient understands risks and agrees with Anesthesia plan.  Questions answered. Anesthesia consent signed with patient.  ASA Score: 2     Day of Surgery Review of History & Physical:    H&P update referred to the provider.  H&P completed by Anesthesiologist.   Anesthesia Plan Notes: Interpretor used per phone..hospital...        Ready For Surgery From Anesthesia Perspective.

## 2018-10-28 NOTE — ANESTHESIA PROCEDURE NOTES
Epidural    Patient location during procedure: OB   Reason for block: primary anesthetic   Diagnosis: IUP   Start time: 10/28/2018 7:40 AM  Timeout: 10/28/2018 7:40 AM  End time: 10/28/2018 8:15 AM  Staffing  Anesthesiologist: Luca Jones MD  Performed: anesthesiologist   Preanesthetic Checklist  Completed: patient identified, site marked, pre-op evaluation, timeout performed, IV checked, risks and benefits discussed, monitors and equipment checked, anesthesia consent given, hand hygiene performed and patient being monitored  Preparation  Patient position: sitting  Prep: ChloraPrep  Patient monitoring: ECG, Pulse Ox and Blood Pressure  Epidural  Skin Anesthetic: lidocaine 1%  Skin Wheal: 5 mL  Administration type: continuous  Approach: midline  Interspace: L3-4  Injection technique: KAVNO air  Needle and Epidural Catheter  Needle type: Tuohy   Needle gauge: 17  Needle length: 3.5 inches  Needle insertion depth: 5 cm  Catheter type: springwound and multi-orifice  Catheter size: 19 G  Catheter at skin depth: 10 cm  Test dose: 3 mL of lidocaine 1.5% with Epi 1-to-200,000  Additional Documentation: incremental injection, no paresthesia on injection, no significant pain on injection, negative aspiration for heme and CSF, no signs/symptoms of IV or SA injection and no significant complaints from patient  Needle localization: anatomical landmarks  Assessment  Upper dermatomal levels - Left: T6  Right: T6   Dermatomal levels determined by alcohol wipe  Ease of block: easy  Patient's tolerance of the procedure: comfortable throughout block and no complaints  Post dural Puncture Headache?: No

## 2018-10-28 NOTE — OP NOTE
10/28/2018      Pre-op:  Term pregnancy    Post-op:  Term pregnancy    Procedure:      Indications: 32 y.o.  with IUP at 39w4d admitted in labor.  Patient progressed to C/+2.    Findings:  male infant, vertex presentation, APGARS 9 at 1 minute, 9 at 5 minutes, weight is pending.  Cord and placenta very small.      Procedure:  Patient was placed in dorsal lithotomy position.  Patient was prepped and draped in a sterile fashion.  Patient began to push.  The vertex crowned and delivered. There was then a gush of mod meconium.  The mouth and nose were suctioned with the bulb.  The remaining infant delivered without difficulty. The cord was cut and clamped.  The infant was vigorous upon delivery.  The infant was handed to the nurse.  Cord blood specimen was obtained.  The placenta delivered spontaneously and intact.  The patient tolerated the procedure well.  Patient will be monitored in recovery.    Laceration :  No  Episiotomy:  No  Forceps: No  Vacuum: No    EBL: 200 cc    Specimen:  Placenta sent No    Complications:  None    Romero Hull MD

## 2018-10-28 NOTE — H&P
History and Physical  Obstetrics      SUBJECTIVE:     Jazlyn Martínez is a 32 y.o.  female with an Estimated Date of Delivery: 10/31/18 admitted for labor management.  Her current obstetrical history is significant for marginal IUGR.  Last u/s shows VTX and EFW 16%.  She reports contractions since last night. Fetal Movement: normal.    PTA Medications   Medication Sig    prenatal no115-iron-folic acid (PRENATAL 19) 29 mg iron- 1 mg Chew Take 1 tablet by mouth once daily.       Review of patient's allergies indicates:  No Known Allergies     No past medical history on file.  No past surgical history on file.  Family History   Problem Relation Age of Onset    Diabetes Mother      Social History     Tobacco Use    Smoking status: Never Smoker    Smokeless tobacco: Never Used   Substance Use Topics    Alcohol use: No    Drug use: No        OBJECTIVE:     Vital Signs (Most Recent)       Physical Exam:  General:  NAD   Abdomen:  gravid, non-tender   Cervix:     Dilation: 6 cm    Effacement: 100%    Station:  -2     FHT:  140's reactive     Laboratory:  No results for input(s): ABORH, HEPBSAG, RUBELLAIGGSC, GBS, AFP, SACSGKX5TZ in the last 168 hours.   ASSESSMENT/PLAN:     39w4d gestation.  Active phase labor.   Conditions: N/A     Patient previously consented in the office.    Admit for delivery

## 2018-10-29 LAB
BASOPHILS # BLD AUTO: 0.02 K/UL
BASOPHILS NFR BLD: 0.2 %
DIFFERENTIAL METHOD: ABNORMAL
EOSINOPHIL # BLD AUTO: 0.1 K/UL
EOSINOPHIL NFR BLD: 0.8 %
ERYTHROCYTE [DISTWIDTH] IN BLOOD BY AUTOMATED COUNT: 17.6 %
HCT VFR BLD AUTO: 32.7 %
HGB BLD-MCNC: 10.8 G/DL
LYMPHOCYTES # BLD AUTO: 2.5 K/UL
LYMPHOCYTES NFR BLD: 19.6 %
MCH RBC QN AUTO: 25.6 PG
MCHC RBC AUTO-ENTMCNC: 33 G/DL
MCV RBC AUTO: 78 FL
MONOCYTES # BLD AUTO: 0.8 K/UL
MONOCYTES NFR BLD: 5.8 %
NEUTROPHILS # BLD AUTO: 9.5 K/UL
NEUTROPHILS NFR BLD: 73.6 %
PLATELET # BLD AUTO: 215 K/UL
PMV BLD AUTO: 11.6 FL
RBC # BLD AUTO: 4.22 M/UL
RPR SER QL: NORMAL
WBC # BLD AUTO: 12.84 K/UL

## 2018-10-29 PROCEDURE — 11000001 HC ACUTE MED/SURG PRIVATE ROOM

## 2018-10-29 PROCEDURE — 85025 COMPLETE CBC W/AUTO DIFF WBC: CPT

## 2018-10-29 PROCEDURE — 25000003 PHARM REV CODE 250: Performed by: OBSTETRICS & GYNECOLOGY

## 2018-10-29 PROCEDURE — 36415 COLL VENOUS BLD VENIPUNCTURE: CPT

## 2018-10-29 RX ADMIN — IBUPROFEN 600 MG: 600 TABLET ORAL at 06:10

## 2018-10-29 RX ADMIN — IBUPROFEN 600 MG: 600 TABLET ORAL at 05:10

## 2018-10-29 RX ADMIN — OXYCODONE HYDROCHLORIDE AND ACETAMINOPHEN 1 TABLET: 10; 325 TABLET ORAL at 10:10

## 2018-10-29 RX ADMIN — OXYCODONE HYDROCHLORIDE AND ACETAMINOPHEN 1 TABLET: 5; 325 TABLET ORAL at 05:10

## 2018-10-29 RX ADMIN — IBUPROFEN 600 MG: 600 TABLET ORAL at 12:10

## 2018-10-29 RX ADMIN — DOCUSATE SODIUM 200 MG: 100 CAPSULE, LIQUID FILLED ORAL at 08:10

## 2018-10-29 RX ADMIN — OXYCODONE HYDROCHLORIDE AND ACETAMINOPHEN 1 TABLET: 10; 325 TABLET ORAL at 08:10

## 2018-10-29 NOTE — LACTATION NOTE
This note was copied from a baby's chart.     10/28/18 1111   Maternal Infant Assessment   Breast Size Issue none   Infant Assessment   Sucking Reflex present   Rooting Reflex present   Swallow Reflex present   Skin Color pink   LATCH Score   Latch 2-->grasps breast, tongue down, lips flanged, rhythmic sucking   Audible Swallowing 2-->spontaneous and intermittent (24 hrs old)   Type Of Nipple 2-->everted (after stimulation)   Comfort (Breast/Nipple) 2-->soft/nontender   Hold (Positioning) 2-->no assist from staff, mother able to position/hold infant   Score (less than 7 for 2/more consecutive times, consult Lactation Consultant) 10   Maternal Infant Feeding   Maternal Emotional State independent;relaxed   Time Spent (min) 15-30 min   Infant First Feeding   Breastfeeding Right Side (min) 40 Min   Feeding Infant   Audible Swallow yes   Lactation Interventions   Attachment Promotion breastfeeding assistance provided;infant-mother separation minimized;privacy provided;skin-to-skin contact encouraged   Latch Promotion positioning assisted

## 2018-10-29 NOTE — ANESTHESIA POSTPROCEDURE EVALUATION
"Anesthesia Post Evaluation    Patient: Jazlyn Martínez    Procedure(s) Performed: * No procedures listed *    Final Anesthesia Type: epidural  Patient location during evaluation: PACU  Patient participation: Yes- Able to Participate  Level of consciousness: awake and alert, oriented and awake  Post-procedure vital signs: reviewed and stable  Pain management: adequate  Airway patency: patent  PONV status at discharge: No PONV  Anesthetic complications: no      Cardiovascular status: blood pressure returned to baseline, hemodynamically stable and stable  Respiratory status: unassisted and spontaneous ventilation  Hydration status: euvolemic  Follow-up not needed.        Visit Vitals  /72   Pulse (!) 54   Temp 36.9 °C (98.5 °F) (Oral)   Resp 18   Ht 5' 4" (1.626 m)   Wt 69.4 kg (152 lb 16 oz)   LMP 01/24/2018   SpO2 100%   Breastfeeding? Unknown   BMI 26.26 kg/m²       Pain/Rosa Score: Pain Rating Prior to Med Admin: 8 (10/28/2018  1:47 PM)        "

## 2018-10-29 NOTE — PLAN OF CARE
Problem: Breastfeeding (Infant)  Intervention: Promote Positive Maternal Experience  Patient is exclusively breastfeeding infant at this time, states she breastfeeds exclusively in the hospital then introduces formula at home, she states she has 2 children at home that she fed in this manner.

## 2018-10-29 NOTE — PLAN OF CARE
Problem: Patient Care Overview  Goal: Individualization & Mutuality  Outcome: Ongoing (interventions implemented as appropriate)  VSS, voiding without difficulty, ambulating in the room, complains of abdominal pain, well controlled by PO medication, patient is breastfeeding her infant independently, infant's father is at the bedside and is assisting in the care of the .

## 2018-10-29 NOTE — PLAN OF CARE
Problem: Patient Care Overview  Goal: Plan of Care Review  Outcome: Ongoing (interventions implemented as appropriate)  Plan of care reviewed, [employer] Language Line #[id number] used for interpretation. Fundus firm. Bonding with infant. Frequent checks made to ensure safety and comfort. Bed low, call bell within reach. Will continue to monitor.

## 2018-10-29 NOTE — PLAN OF CARE
Problem: Patient Care Overview  Goal: Plan of Care Review  Outcome: Ongoing (interventions implemented as appropriate)  Plan of care reviewed with patient and significant other, all questions answered.

## 2018-10-29 NOTE — LACTATION NOTE
Called to room, patient states that she would like a bottle of formula for her infant this morning.  Instructed on the AAP recommendation of exclusive breastfeeding for the first 6 months of life and continued breastfeeding with the introduction of supplemental foods beyond the first year of life.  Instructed on the recommendation to delay all bottle and pacifier use until after 4 weeks of age and breastfeeding is well established.  Discussed the benefits of exclusive breastfeeding for both mother and baby.  Discussed the risks of supplementation/pacifier use on the exclusivity of breastfeeding in the first 6 months.  Pt states understanding and verbalized appropriate recall. Again, patient states that she both breast and bottle fed her other 2 children and would like a bottle and standard nipple.  Similac Pro Advance and standard nipple provided.

## 2018-10-29 NOTE — PROGRESS NOTES
No complaints.  Bleeding and pain are controlled.     Vital Signs (Most Recent):  Temp: 98.6 °F (37 °C) (10/29/18 0802)  Pulse: 84 (10/29/18 0802)  Resp: 18 (10/29/18 0802)  BP: 126/68 (10/29/18 0802)  SpO2: 100 % (10/28/18 1005)    Vital Signs Range (Last 24H):  Temp:  [98.3 °F (36.8 °C)-98.6 °F (37 °C)]   Pulse:  [54-86]   Resp:  [18]   BP: (113-144)/(65-81)   SpO2:  [98 %-100 %]     Gen - NAD  Uterus - firm, non-tender      Recent Labs   Lab 10/29/18  0620   WBC 12.84*   HGB 10.8*   HCT 32.7*          A/P PPD #1  Routine care

## 2018-10-29 NOTE — LACTATION NOTE
"This note was copied from a baby's chart.     10/29/18 0805   Maternal Infant Assessment   Breast Density Bilateral:;soft   Areola Bilateral:;elastic   Nipple(s) Bilateral:;everted   Infant Assessment   Sucking Reflex present   Rooting Reflex present   Swallow Reflex present   LATCH Score   Latch 2-->grasps breast, tongue down, lips flanged, rhythmic sucking   Audible Swallowing 2-->spontaneous and intermittent (24 hrs old)   Type Of Nipple 2-->everted (after stimulation)   Comfort (Breast/Nipple) 2-->soft/nontender   Hold (Positioning) 2-->no assist from staff, mother able to position/hold infant   Score (less than 7 for 2/more consecutive times, consult Lactation Consultant) 10   Maternal Infant Feeding   Maternal Emotional State relaxed;independent   Infant Positioning cradle   Signs of Milk Transfer audible swallow;infant jaw motion present   Time Spent (min) 0-15 min   Latch Assistance no   Breastfeeding History   Breastfeeding History yes   Infant First Feeding   Breastfeeding Left Side (min) 10 Min  (cont to nurse)   Feeding Infant   Feeding Tolerance/Success alert for feeding   Effective Latch During Feeding yes   Audible Swallow yes   Suck/Swallow Coordination present   Lactation Referrals   Lactation Consult Breastfeeding assessment;Follow up;Knowledge deficit   Lactation Interventions   Attachment Promotion breastfeeding assistance provided     Independently latched well to left breast in cradle hold; audible swallows noted.  Encouraged to call for assist prn.  States "understand" and verbalized appropriate recall.  "

## 2018-10-30 VITALS
BODY MASS INDEX: 26.12 KG/M2 | HEIGHT: 64 IN | SYSTOLIC BLOOD PRESSURE: 120 MMHG | WEIGHT: 153 LBS | TEMPERATURE: 98 F | DIASTOLIC BLOOD PRESSURE: 66 MMHG | OXYGEN SATURATION: 100 % | RESPIRATION RATE: 18 BRPM | HEART RATE: 86 BPM

## 2018-10-30 PROBLEM — Z34.93 ENCOUNTER FOR SUPERVISION OF NORMAL PREGNANCY IN THIRD TRIMESTER: Status: RESOLVED | Noted: 2018-10-10 | Resolved: 2018-10-30

## 2018-10-30 PROCEDURE — 25000003 PHARM REV CODE 250: Performed by: OBSTETRICS & GYNECOLOGY

## 2018-10-30 RX ORDER — IBUPROFEN 600 MG/1
600 TABLET ORAL EVERY 6 HOURS PRN
Qty: 60 TABLET | Refills: 1 | Status: ON HOLD | OUTPATIENT
Start: 2018-10-30 | End: 2020-10-06

## 2018-10-30 RX ORDER — OXYCODONE AND ACETAMINOPHEN 5; 325 MG/1; MG/1
1 TABLET ORAL EVERY 4 HOURS PRN
Qty: 12 TABLET | Refills: 0 | Status: ON HOLD | OUTPATIENT
Start: 2018-10-30 | End: 2020-10-06

## 2018-10-30 RX ADMIN — OXYCODONE HYDROCHLORIDE AND ACETAMINOPHEN 1 TABLET: 5; 325 TABLET ORAL at 07:10

## 2018-10-30 RX ADMIN — IBUPROFEN 600 MG: 600 TABLET ORAL at 03:10

## 2018-10-30 NOTE — PROGRESS NOTES
Discharge teaching given to pt. With prescription education. Pt. verbalized understanding with good recall noted. Pt. enc. To call md office once discharged to schedule a f/u appt. and for any questions or concerns that may arise once discharged. No c/o pain at this time.   [employer]  used for translation, id #=[id number]  S.o. At bedside as well.

## 2018-10-30 NOTE — PLAN OF CARE
Problem: Patient Care Overview  Goal: Plan of Care Review  Pt voiding urine and passing flatus. VSS. No acute distress noted. Vaginal bleed light. Fundus firm. Breastfeeding. Nipples tender and red. Encouraged lanolin cream. Discussed risks of engorgement. Bonding with infant appropriately. Encouraged ambulation and hydration.

## 2018-10-30 NOTE — DISCHARGE SUMMARY
32 y.o.   OB History      Para Term  AB Living    4 3 3 0 1 3    SAB TAB Ectopic Multiple Live Births    1 0 0 0 3        admitted for Uncomplicated vaginal delivery.  Postpartum course unremarkable.  Patient is breast infant.    D/c home  Admit date 10/28/2018  6:31 AM  D/c date 10/30/2018  Discharge instructions - pelvic rest  Discharge followup 1 week for , 6 weeks for vaginal delivery  Meds listed seperately\

## 2018-10-30 NOTE — LACTATION NOTE
[employer]  used for translation, id#= [id number]. No questions at this time from pt., she voices all understanding of teaching and questions.

## 2018-10-30 NOTE — DISCHARGE INSTRUCTIONS
After a Vaginal Birth    General Discharge Instructions  May follow a regular diet, unless otherwise discussed with physician.  Take showers, not baths unless otherwise discussed with physician.  Activity as tolerated.  No lifting or heavy exercise for 6 weeks, no driving for 2 weeks, no sexual intercourse, douching or tampons for 6 weeks  May return to work/school as discussed with physician  Discuss birth control with physician  Breast care support bra worn at all times  Lactation consultant referral number ( [phone] or [phone])    Call Your Healthcare Provider Right Away If You Have:  A fever of 101°F or higher.  Heavy vaginal bleeding, clots, or vaginal discharge with foul odor.  Redness, discharge, or pain worse than you had in the hospital.  Burning, pain, red streaks, or lumpy areas in your breasts.  Cracks, blisters, or blood on your nipples.  Burning or pain when you urinate.  Persistent nausea or vomiting.  Severe headaches, blurred vision, dizziness or fainting.  Feelings of extreme sadness or anxiety, or a feeling that you dont want to be with your baby.  No bowel movement for 5 days.  Redness, warmth, swelling, or pain in the lower leg.      If You Had Stitches  You may have received stitches in the skin near your vagina. The stitches might have closed an episiotomy (an incision that enlarges the opening of the vagina). Or you may have needed stitches to repair torn skin. Either way, your stitches should dissolve within weeks. Until then, you can help reduce discomfort, aid healing, and reduce your risk of infection by keeping the stitches clean. These tips can help:  Gently wipe from front to back after you urinate or have a bowel movement.  After wiping, spray warm water on the area. Or you can have a sitz bath. This means sitting in a tub with a few inches of water in it. Then pat the area dry or use a hairdryer on a cool setting.  Do not use soap or any solution except water on the  area.  You can take a shower unless told not to.  Change sanitary pads at least every 2-4 hours.  Place cold or heat packs on the area as directed by your doctors or nurses. Keep a thin towel between the pack and your skin.  Sit on firm seats so the stitches pull less.       Follow-Up  Schedule a  follow-up exam with your healthcare provider for about 6 weeks after delivery. During this exam, your uterus and vaginal area will be checked. Contact your healthcare provider if you think you or your baby are having any problems.    After a Vaginal Birth   After having a baby, your body may be very tired. It can take time to recover from a vaginal delivery. You may stay in the hospital or birth center from 1 to 4 days. In some cases, you may be able to go home the same day.       Right after the delivery   Your temperature and blood pressure will be taken until they are stable. A nurse or other healthcare provider will observe you as you rest. You may have afterbirth pains. These are cramps caused by the uterus shrinking. Sanitary pads are used to absorb the discharge of the uterine lining. To ensure that you arent bleeding too much, the pad will be checked. And the firmness of your uterus will be checked. To do this, a nurse will gently push down on your abdomen. If you had anesthesia, youll be watched closely until you can feel and move your toes. If you have perineal pain (pain between the vagina and anus), an ice pack can help.   [place] care   While still in the hospital or birth center, youll learn how to hold and feed your baby. You will also be given instructions on how to care for your baby. This includes bathing and feeding.   Preparing to go home   You may be anxious to go home as soon as possible. Before you and your baby go home, a healthcare provider will check to be sure you are healthy enough to take care of your baby and yourself. Youre ready to go home when:   You can walk to the  bathroom without help.   You can eat solid food and swallow pills (if needed).   You have no sign of infection or other health problems, including fever.   Before leaving the hospital or birth center, youll be given written instructions for home self-care after vaginal delivery. Be sure to follow these instructions carefully. If you have questions or concerns, talk about them now.   If you had stitches   You may have received stitches in the skin near your vagina. The stitches might have closed an episiotomy (an incision that enlarges the opening of the vagina). Or you may have needed stitches to repair torn skin. Either way, your stitches should dissolve within weeks. Until then, you can help reduce discomfort, aid healing, and reduce your risk of infection by keeping the stitches clean. These tips can help:   Gently wipe from front to back after you urinate or have a bowel movement.   After wiping, spray warm water on the area. Or you can have a sitz bath. This means sitting in a tub with a few inches of water in it. Then pat the area dry or use a hairdryer on a cool setting.   Do not use soap or any solution except water on the area.   You can take a shower unless told not to.   Change sanitary pads at least every 2-4 hours.   Place cold or heat packs on the area as directed by your doctors or nurses. Keep a thin towel between the pack and your skin.   Sit on firm seats so the stitches pull less.   follow-up   Schedule a  follow-up exam with your health care provider for about 6 weeks after delivery. During this exam, your uterus and vaginal area will be checked. Contact your health care provider if you think you or your baby are having any problems.       After Delivery: When to Call the Health Care Provider   Health problems sometimes arise with you or your baby following delivery. Call your baby's health care provider or your health care provider if you see any of the signs below.        Watch your baby for these signs   Call your babys health care provider if your baby:   Has a rectal temperature of 100.4°F (38°C) or higher   Has fewer than 6 wet diapers a day (Hint: Disposable diapers may feel heavy or hard after being soaked.)   Skin or whites of the eyes appear yellow   Cries for a long time, or if it sounds as if the cries are caused by pain   Has diarrhea   Refuses two feedings in a row   Is inactive or listless   Is vomiting   Has blood in the stool or vomit   Has a rash   Has ear drainage   Has difficulty breathing   Has a seizure   Will not wake up  Trust your instincts. If you are concerned about your baby, call your health care provider.   Watch your own health for these signs   Call your own health care provider if you have:   Burning or pain in your breasts   Red streaks or hard lumpy areas in your breasts   Problems with breast feeding   A fever of 100.4°F (38°C) or higher   Extreme tiredness or body aches, as if you have the flu   Feelings of extreme sadness or anxiety, or a feeling that you dont want to be with your baby   Abdominal pain that isnt relieved with medicine   Vaginal discharge that has a bad odor   Vaginal bleeding that soaks more than one pad per hour   If you had a  section, call for concerns about your incision site such as pain, drainage or bleeding from your incision      Incision Care After Vaginal Birth   After your babys birth, you may have needed stitches in the skin near your vagina. The stitches might have closed an episiotomy (an incision that enlarges the opening of the vagina). Or you may have needed stitches to repair torn skin. Either way, your stitches should dissolve within weeks. Until then, use this handout as a guide to help ease any discomfort and aid healing.       Keep Clean   You can reduce your risk of infection by keeping the area around the stitches clean. These hints can help:   Gently wipe from front to back after you  urinate or have a bowel movement.   After wiping, spray warm water on the stitches. Pat dry. If you are too sore, just spray the area after urination and then pat dry without wiping. If you are too sore, just spray the area after urination and then pat dry without wiping.   Do not use soap or any solution except water unless instructed by your health care provider.   Change sanitary pads at least every 2-4 hours.      Eat to Stay Regular   Having bowel movements is easier if youre not constipated. Follow these tips:   Eat fresh fruit and vegetables, whole grains, and bran cereals.   Drink plenty of water.   Dont strain to have a bowel movement.   Ask your health care provider about using a stool softener. If you are breastfeeding, ask before you take any medication.      Reduce Your Discomfort   Sit in a warm bath (sitz bath).   Place cold or heat packs on your stitches. Keep a thin towel between the pack and your skin.   Sit on a firm seat so the stitches pull less.   Use medicated spray as ordered by your health care provider.  Call your doctor or health care provider if you have:   Repeated clots the size of a quarter or larger passing from the vagina   Heavy or gushing bleeding from the vagina   Discharge that has a bad odor   Severe pain in the abdomen or increased pain near your stitches   Fever or chills   No bowel movement within one week after the birth of your baby   Pain or urgency with urination, or inability to urinate        Breast Care After Birth   A few days after your babys birth, your breasts will swell with milk. They are likely to feel tender and heavy. This is normal. To help prevent breast soreness and control irritation, follow these tips:       Coping with swelling   Use cold compresses or an ice pack to help reduce the ache or pain.   Breastfeed often to keep milk from clogging your breast ducts.   If your nipples are flat from breast swelling, hand express some milk. Squeeze out a few  drops of milk by massaging and compressing your breasts.   If you have swelling with pain or fever, call your health care provider.  Preventing sore nipples   Make sure baby latches on to your breast correctly. The babys tongue should always be under your nipple, and your entire areola should be in the baby's mouth.   You can let milk dry on your nipples. This dried milk can protect the skin on your nipple/breasts.   Do not use alcohol, soap, or scented cleansers on your breasts. These can cause the nipples to dry and crack.   Do not wear nursing pads that are lined with plastic. They hold in moisture and can cause chapping.   If you experience cracked or bleeding nipples, consult your doctor or a lactation consultant. He or she will ensure that your baby's latch is correct and may suggest topical treatment, such as pure lanolin.  Choosing a good bra   Wearing the right-sized bra is especially important now. If a bra is too tight, it may cause a duct in your breast to clog and become irritated. If possible, have a salesperson help fit you for a new bra. Look for one thats 100% cotton and comfortable. Also, choose a bra with wide straps that wont dig into your back and shoulders. If youre breastfeeding, find a nursing bra that allows you to uncover one breast at a time.   If you are not breastfeeding:   Avoid stimulation of nipples   Wear a tight-fitting bra   Apply ice packs for discomfort               Breastfeeding Discharge Instructions      AAP recommendation of exclusive breastfeeding for the first 6 months of life and continued breastfeeding with the introduction of supplemental foods beyond the first year of life and recommends to delay all bottle and pacifier use until after 4 weeks of age and breastfeeding is well established.  Discussed the benefits of exclusive breastfeeding for both mother and baby.  Discussed the risks of supplementation/pacifier use on the exclusivity of breastfeeding in the  first 6 months. Feed the baby at the earliest sign of hunger or comfort  o Hands to mouth, sucking motions  o Rooting or searching for something to suck on  o Dont wait for crying - it is a not a late sign of hunger; it is a sign of distress     The feedings may be 8-12 times per 24hrs and will not follow a schedule   Alternate the breast you start the feeding with, or start with the breast that feels the fullest   Switch breasts when the baby takes himself off the breast or falls asleep   Keep offering breasts until the baby looks full, no longer gives hunger signs, and stays asleep when placed on his back in the crib   If the baby is sleepy and wont wake for a feeding, put the baby skin-to-skin dressed in a diaper against the mothers bare chest   Sleep near your baby   The baby should be positioned and latched on to the breast correctly  o Chest-to-chest, chin in the breast  o Babys lips are flipped outward  o Babys mouth is stretched open wide like a shout  o Babys sucking should feel like tugging to the mother  - The baby should be drinking at the breast:  o You should hear swallowing or gulping throughout the feeding  o You should see milk on the babys lips when he comes off the breast  o Your breasts should be softer when the baby is finished feeding  o The baby should look relaxed at the end of feedings  o After the 4th day and your milk is in:  o The babys poop should turn bright yellow and be loose, watery, and seedy  o The baby should have at least 3-4 poops the size of the palm of your hand per day  o The baby should have at least 6-8 wet diapers per day  o The urine should be light yellow in color  You should drink when you are thirsty and eat a healthy diet when you are    hungry.     Take naps to get the rest you need.   Take medications and/or drink alcohol only with permission of your obstetrician    or the babys pediatrician.  You can also call the [facility],    ([phone]), Monday-Friday, 8am-5pm Central time, to get the most   up-to-date evidence-based information on the use of medications during   pregnancy and breastfeeding.      The baby should be examined by a pediatrician at 3-5 days of age; unless ordered sooner by the pediatrician.  Once your milk comes in, the baby should be back to birth weight no later than 10-14 days of age.    Primary Engorgement    If the milk is flowing, use wet or dry heat applied to the breasts for approximately 10min prior to each feeding as a comfort measure to facilitate the milk ejection reflex    Follow heat treatment with breast massage to soften hard/lumpy areas of the breast    Use unrestricted, frequent, effective feedings.      Wake baby to feed if necessary    Avoid pacifier and bottle feedings    Hand express or pump breasts to the point of comfort prn    Use cold treatments in the form of ice packs/gel packs/ frozen vegetables wrapped in a soft thin cloth and applied to the breasts for approximately 20min after each feeding until engorgement is resolved    Wear comfortable, supportive bra    Take pain medicine prn    Use anti-inflammatory medications if prescribed by physician    Other:    [place] Pumping Instructions :    Preparation and Hygiene:    1. Shower daily.  2. Wear a clean bra each day and wash daily in warm soapy water.  3. Change wet or moist breast pads frequently.  Moist pads can promote growth of germs.  4. Actively wash your hands, paying close attention to the area around and under your fingernails, thoroughly with soap and water for 15 seconds before pumping or handling your milk.  Re-wash your hands if you touch anything (scratching your nose, answering the phone, etc) while pumping or handling your milk.   5. Before pumping your breasts, assemble the pump collection kit and have ready the sterile container and labels.  Place these items on a clean surface next to the breastpump.  6. Each time after  you have finished pumping, take apart all of the parts of the breastpump collection kit and place them in a separate cleaning container (do not place them in the sink).  Be sure to remove the yellow valve from the breastshield and separate the white membrane from the yellow valve.  Rinse all of these parts with cool water.  Then use a new sponge and/or bottle brush and dishwashing detergent to clean the parts.  Rinse off the soapy water with cool water and air dry on a clean towel covered with a clean cloth.  All parts may also be washed after each use in the top rack of a .  7. Once each day, sterilize all of the parts of the breastpump collection kit.  This can be done by boiling the kit parts for 10 minutes or by using a Quick Clean Micro-Steam Bag made by [employer]  8. If condensation appears in the tubing, continue to run the pump with the tubing attached for 1-2 minutes or until the tubing is dry.   9. Notify your babys nurse or doctor if you become ill or need to take any medication, even over-the-counter medicines.        Collection and Storage of Expressed Breastmilk:         1. Pump your breasts at least 8-10 times every 24 hours.  Double pump (both breasts at  the same time) for at least 15-20 minutes using the most suction that is comfortable.    2. Write the date and time of pumping and the name of any medications you are takingon the babys pre-printed hospital identification label.   3.    Do not touch the inside of the storage containers or lids.      4.        Tightly screw the lid onto the container and place immediately into the                                refrigerator for daily use.  Bottle may remain at room temperature if the next                    feeding is within 4 hours.  5.    Expressed breastmilk should be refrigerated or frozen within 4 hours of                pumping.  6.        Do not store expressed breastmilk on the door of your refrigerator or freeze              where the temperature is warmer.   7.        Refrigerated milk may be stored in for up to 7 days.  At this point it can be              moved to the freezer for 6 -12 months.  8.        Thaw frozen breast milk in overnight in the refrigerator.  Once milk is thawed it              must be used within 24 hours.  9.        Refrigerated breast milk needs to be warmed to room temperature.  Warm by leaving unrefrigerated until it reached room temperature, or place sealed bottle into a cup of warm (not boiling) water or use a bottle warmer.               Never warm breast milk in a microwave or boiling water.    For any questions or concerns call:  Lactation Department at [phone]

## 2018-11-01 ENCOUNTER — TELEPHONE (OUTPATIENT)
Dept: OBSTETRICS AND GYNECOLOGY | Facility: HOSPITAL | Age: 33
End: 2018-11-01

## 2018-11-02 ENCOUNTER — TELEPHONE (OUTPATIENT)
Dept: OBSTETRICS AND GYNECOLOGY | Facility: HOSPITAL | Age: 33
End: 2018-11-02

## 2020-10-06 ENCOUNTER — ANESTHESIA (OUTPATIENT)
Dept: OBSTETRICS AND GYNECOLOGY | Facility: HOSPITAL | Age: 35
End: 2020-10-06
Payer: MEDICAID

## 2020-10-06 ENCOUNTER — HOSPITAL ENCOUNTER (INPATIENT)
Facility: HOSPITAL | Age: 35
LOS: 1 days | Discharge: HOME | End: 2020-10-07
Attending: OBSTETRICS & GYNECOLOGY | Admitting: OBSTETRICS & GYNECOLOGY
Payer: MEDICAID

## 2020-10-06 ENCOUNTER — ANESTHESIA EVENT (OUTPATIENT)
Dept: OBSTETRICS AND GYNECOLOGY | Facility: HOSPITAL | Age: 35
End: 2020-10-06
Payer: MEDICAID

## 2020-10-06 DIAGNOSIS — Z34.90 PREGNANCY WITH ONE FETUS: Primary | ICD-10-CM

## 2020-10-06 PROBLEM — O99.820 GBS (GROUP B STREPTOCOCCUS CARRIER), +RV CULTURE, CURRENTLY PREGNANT: Status: ACTIVE | Noted: 2020-09-22

## 2020-10-06 LAB
ABO + RH BLD: NORMAL
ALBUMIN SERPL BCP-MCNC: 3.1 G/DL (ref 3.5–5.2)
ALP SERPL-CCNC: 246 U/L (ref 55–135)
ALT SERPL W/O P-5'-P-CCNC: 10 U/L (ref 10–44)
ANION GAP SERPL CALC-SCNC: 12 MMOL/L (ref 8–16)
AST SERPL-CCNC: 14 U/L (ref 10–40)
BASOPHILS # BLD AUTO: 0.04 K/UL (ref 0–0.2)
BASOPHILS NFR BLD: 0.3 % (ref 0–1.9)
BILIRUB SERPL-MCNC: 0.1 MG/DL (ref 0.1–1)
BLD GP AB SCN CELLS X3 SERPL QL: NORMAL
BUN SERPL-MCNC: 9 MG/DL (ref 6–20)
CALCIUM SERPL-MCNC: 9.2 MG/DL (ref 8.7–10.5)
CHLORIDE SERPL-SCNC: 104 MMOL/L (ref 95–110)
CO2 SERPL-SCNC: 20 MMOL/L (ref 23–29)
CREAT SERPL-MCNC: 0.6 MG/DL (ref 0.5–1.4)
DIFFERENTIAL METHOD: ABNORMAL
EOSINOPHIL # BLD AUTO: 0.1 K/UL (ref 0–0.5)
EOSINOPHIL NFR BLD: 0.5 % (ref 0–8)
ERYTHROCYTE [DISTWIDTH] IN BLOOD BY AUTOMATED COUNT: 17.1 % (ref 11.5–14.5)
EST. GFR  (AFRICAN AMERICAN): >60 ML/MIN/1.73 M^2
EST. GFR  (NON AFRICAN AMERICAN): >60 ML/MIN/1.73 M^2
GLUCOSE SERPL-MCNC: 85 MG/DL (ref 70–110)
HCT VFR BLD AUTO: 37.8 % (ref 37–48.5)
HGB BLD-MCNC: 12 G/DL (ref 12–16)
IMM GRANULOCYTES # BLD AUTO: 0.17 K/UL (ref 0–0.04)
IMM GRANULOCYTES NFR BLD AUTO: 1.3 % (ref 0–0.5)
LYMPHOCYTES # BLD AUTO: 3.5 K/UL (ref 1–4.8)
LYMPHOCYTES NFR BLD: 26.7 % (ref 18–48)
MCH RBC QN AUTO: 24.2 PG (ref 27–31)
MCHC RBC AUTO-ENTMCNC: 31.7 G/DL (ref 32–36)
MCV RBC AUTO: 76 FL (ref 82–98)
MONOCYTES # BLD AUTO: 1 K/UL (ref 0.3–1)
MONOCYTES NFR BLD: 7.9 % (ref 4–15)
NEUTROPHILS # BLD AUTO: 8.3 K/UL (ref 1.8–7.7)
NEUTROPHILS NFR BLD: 63.3 % (ref 38–73)
NRBC BLD-RTO: 0 /100 WBC
PLATELET # BLD AUTO: 244 K/UL (ref 150–350)
PMV BLD AUTO: 11.8 FL (ref 9.2–12.9)
POTASSIUM SERPL-SCNC: 4.1 MMOL/L (ref 3.5–5.1)
PROT SERPL-MCNC: 7.1 G/DL (ref 6–8.4)
RBC # BLD AUTO: 4.95 M/UL (ref 4–5.4)
RPR SER QL: NORMAL
SARS-COV-2 RDRP RESP QL NAA+PROBE: NEGATIVE
SODIUM SERPL-SCNC: 136 MMOL/L (ref 136–145)
WBC # BLD AUTO: 13.09 K/UL (ref 3.9–12.7)

## 2020-10-06 PROCEDURE — 25000003 PHARM REV CODE 250: Performed by: OBSTETRICS & GYNECOLOGY

## 2020-10-06 PROCEDURE — 80053 COMPREHEN METABOLIC PANEL: CPT

## 2020-10-06 PROCEDURE — 86592 SYPHILIS TEST NON-TREP QUAL: CPT

## 2020-10-06 PROCEDURE — 63600175 PHARM REV CODE 636 W HCPCS: Performed by: OBSTETRICS & GYNECOLOGY

## 2020-10-06 PROCEDURE — C1751 CATH, INF, PER/CENT/MIDLINE: HCPCS | Performed by: ANESTHESIOLOGY

## 2020-10-06 PROCEDURE — U0002 COVID-19 LAB TEST NON-CDC: HCPCS

## 2020-10-06 PROCEDURE — 27200710 HC EPIDURAL INFUSION PUMP SET: Performed by: ANESTHESIOLOGY

## 2020-10-06 PROCEDURE — 85025 COMPLETE CBC W/AUTO DIFF WBC: CPT

## 2020-10-06 PROCEDURE — 59409 OBSTETRICAL CARE: CPT | Mod: AA,,, | Performed by: ANESTHESIOLOGY

## 2020-10-06 PROCEDURE — 59409 PRA ETRICAL CARE,VAG DELIV ONLY: ICD-10-PCS | Mod: AA,,, | Performed by: ANESTHESIOLOGY

## 2020-10-06 PROCEDURE — 36415 COLL VENOUS BLD VENIPUNCTURE: CPT

## 2020-10-06 PROCEDURE — 25000003 PHARM REV CODE 250: Performed by: ANESTHESIOLOGY

## 2020-10-06 PROCEDURE — 86901 BLOOD TYPING SEROLOGIC RH(D): CPT

## 2020-10-06 PROCEDURE — 62326 NJX INTERLAMINAR LMBR/SAC: CPT | Performed by: ANESTHESIOLOGY

## 2020-10-06 PROCEDURE — 11000001 HC ACUTE MED/SURG PRIVATE ROOM

## 2020-10-06 PROCEDURE — 99211 OFF/OP EST MAY X REQ PHY/QHP: CPT | Mod: 25,TH

## 2020-10-06 PROCEDURE — 51702 INSERT TEMP BLADDER CATH: CPT

## 2020-10-06 PROCEDURE — 72200004 HC VAGINAL DELIVERY LEVEL I

## 2020-10-06 PROCEDURE — 72100002 HC LABOR CARE, 1ST 8 HOURS

## 2020-10-06 RX ORDER — FENTANYL/BUPIVACAINE/NS/PF 2MCG/ML-.1
PLASTIC BAG, INJECTION (ML) INJECTION CONTINUOUS PRN
Status: DISCONTINUED | OUTPATIENT
Start: 2020-10-06 | End: 2020-10-06

## 2020-10-06 RX ORDER — OXYTOCIN/RINGER'S LACTATE 30/500 ML
95 PLASTIC BAG, INJECTION (ML) INTRAVENOUS CONTINUOUS
Status: ACTIVE | OUTPATIENT
Start: 2020-10-06 | End: 2020-10-06

## 2020-10-06 RX ORDER — ONDANSETRON 2 MG/ML
4 INJECTION INTRAMUSCULAR; INTRAVENOUS EVERY 6 HOURS PRN
Status: DISCONTINUED | OUTPATIENT
Start: 2020-10-06 | End: 2020-10-07 | Stop reason: HOSPADM

## 2020-10-06 RX ORDER — IBUPROFEN 600 MG/1
600 TABLET ORAL EVERY 6 HOURS PRN
Status: DISCONTINUED | OUTPATIENT
Start: 2020-10-06 | End: 2020-10-07 | Stop reason: HOSPADM

## 2020-10-06 RX ORDER — OXYTOCIN/RINGER'S LACTATE 30/500 ML
95 PLASTIC BAG, INJECTION (ML) INTRAVENOUS ONCE
Status: DISCONTINUED | OUTPATIENT
Start: 2020-10-06 | End: 2020-10-07 | Stop reason: HOSPADM

## 2020-10-06 RX ORDER — ONDANSETRON 8 MG/1
8 TABLET, ORALLY DISINTEGRATING ORAL EVERY 8 HOURS PRN
Status: DISCONTINUED | OUTPATIENT
Start: 2020-10-06 | End: 2020-10-06

## 2020-10-06 RX ORDER — CALCIUM CARBONATE 200(500)MG
500 TABLET,CHEWABLE ORAL 3 TIMES DAILY PRN
Status: DISCONTINUED | OUTPATIENT
Start: 2020-10-06 | End: 2020-10-06

## 2020-10-06 RX ORDER — DIPHENHYDRAMINE HYDROCHLORIDE 50 MG/ML
25 INJECTION INTRAMUSCULAR; INTRAVENOUS EVERY 4 HOURS PRN
Status: DISCONTINUED | OUTPATIENT
Start: 2020-10-06 | End: 2020-10-07 | Stop reason: HOSPADM

## 2020-10-06 RX ORDER — SIMETHICONE 80 MG
1 TABLET,CHEWABLE ORAL 4 TIMES DAILY PRN
Status: DISCONTINUED | OUTPATIENT
Start: 2020-10-06 | End: 2020-10-06

## 2020-10-06 RX ORDER — OXYCODONE AND ACETAMINOPHEN 5; 325 MG/1; MG/1
1 TABLET ORAL EVERY 4 HOURS PRN
Status: DISCONTINUED | OUTPATIENT
Start: 2020-10-06 | End: 2020-10-07 | Stop reason: HOSPADM

## 2020-10-06 RX ORDER — FENTANYL/BUPIVACAINE/NS/PF 2MCG/ML-.1
PLASTIC BAG, INJECTION (ML) INJECTION
Status: DISCONTINUED | OUTPATIENT
Start: 2020-10-06 | End: 2020-10-06

## 2020-10-06 RX ORDER — SODIUM CHLORIDE 9 MG/ML
INJECTION, SOLUTION INTRAVENOUS
Status: DISCONTINUED | OUTPATIENT
Start: 2020-10-06 | End: 2020-10-06

## 2020-10-06 RX ORDER — SODIUM CHLORIDE, SODIUM LACTATE, POTASSIUM CHLORIDE, CALCIUM CHLORIDE 600; 310; 30; 20 MG/100ML; MG/100ML; MG/100ML; MG/100ML
INJECTION, SOLUTION INTRAVENOUS CONTINUOUS
Status: DISCONTINUED | OUTPATIENT
Start: 2020-10-06 | End: 2020-10-06

## 2020-10-06 RX ORDER — MISOPROSTOL 200 UG/1
600 TABLET ORAL ONCE AS NEEDED
Status: DISCONTINUED | OUTPATIENT
Start: 2020-10-06 | End: 2020-10-07 | Stop reason: HOSPADM

## 2020-10-06 RX ORDER — OXYTOCIN/RINGER'S LACTATE 30/500 ML
334 PLASTIC BAG, INJECTION (ML) INTRAVENOUS ONCE
Status: DISCONTINUED | OUTPATIENT
Start: 2020-10-06 | End: 2020-10-07 | Stop reason: HOSPADM

## 2020-10-06 RX ORDER — HYDROCORTISONE 25 MG/G
CREAM TOPICAL 3 TIMES DAILY PRN
Status: DISCONTINUED | OUTPATIENT
Start: 2020-10-06 | End: 2020-10-07 | Stop reason: HOSPADM

## 2020-10-06 RX ORDER — MISOPROSTOL 200 UG/1
800 TABLET ORAL
Status: DISCONTINUED | OUTPATIENT
Start: 2020-10-06 | End: 2020-10-07 | Stop reason: HOSPADM

## 2020-10-06 RX ORDER — OXYCODONE AND ACETAMINOPHEN 10; 325 MG/1; MG/1
1 TABLET ORAL EVERY 4 HOURS PRN
Status: DISCONTINUED | OUTPATIENT
Start: 2020-10-06 | End: 2020-10-07 | Stop reason: HOSPADM

## 2020-10-06 RX ORDER — ACETAMINOPHEN 325 MG/1
650 TABLET ORAL EVERY 6 HOURS PRN
Status: DISCONTINUED | OUTPATIENT
Start: 2020-10-06 | End: 2020-10-07 | Stop reason: HOSPADM

## 2020-10-06 RX ADMIN — OXYCODONE HYDROCHLORIDE AND ACETAMINOPHEN 1 TABLET: 10; 325 TABLET ORAL at 01:10

## 2020-10-06 RX ADMIN — SODIUM CHLORIDE, SODIUM LACTATE, POTASSIUM CHLORIDE, AND CALCIUM CHLORIDE 1000 ML: .6; .31; .03; .02 INJECTION, SOLUTION INTRAVENOUS at 04:10

## 2020-10-06 RX ADMIN — SODIUM CHLORIDE, SODIUM LACTATE, POTASSIUM CHLORIDE, AND CALCIUM CHLORIDE: .6; .31; .03; .02 INJECTION, SOLUTION INTRAVENOUS at 07:10

## 2020-10-06 RX ADMIN — SODIUM CHLORIDE, SODIUM LACTATE, POTASSIUM CHLORIDE, AND CALCIUM CHLORIDE: .6; .31; .03; .02 INJECTION, SOLUTION INTRAVENOUS at 05:10

## 2020-10-06 RX ADMIN — Medication 10 ML/HR: at 05:10

## 2020-10-06 RX ADMIN — Medication 2 ML: at 05:10

## 2020-10-06 RX ADMIN — AMPICILLIN SODIUM 2 G: 2 INJECTION, POWDER, FOR SOLUTION INTRAMUSCULAR; INTRAVENOUS at 04:10

## 2020-10-06 RX ADMIN — AMPICILLIN SODIUM 1 G: 1 INJECTION, POWDER, FOR SOLUTION INTRAMUSCULAR; INTRAVENOUS at 08:10

## 2020-10-06 RX ADMIN — IBUPROFEN 600 MG: 600 TABLET, FILM COATED ORAL at 03:10

## 2020-10-06 NOTE — L&D DELIVERY NOTE
10/06/2020      Pre-op:   IUP @ 39w4d    GBS +      Active labor    Thin meconium    Post-op:   IUP @ 39w4d    GBS +      Active labor    Thin meconium      Procedure:       Surgeon: Noelle Wild MD    Indications: 34 y.o.  with IUP at 39w4d admitted in labor.  Patient progressed to C/+2.    Findings:  male infant, vertex presentation, APGARS and weight are pending.    Procedure:  Patient was placed in dorsal lithotomy position.  Patient was prepped and draped in a sterile fashion.  Patient began to push.  The vertex crowned and delivered.  The mouth and nose were suctioned with the bulb.  The remaining infant delivered without difficulty. The cord was clamped and cut.  The infant was vigorous upon delivery.  The infant was handed to the nurse.  Cord blood specimen was obtained.  The placenta delivered spontaneously and intact.  The patient tolerated the procedure well.  Patient will be monitored in recovery.    Laceration :  Yes 1st midline laceration repaired with 3-0 chromic  Episiotomy:  No  Forceps: No  Vacuum: No    EBL: 150 cc    Specimen:  Placenta sent No    Complications:  None

## 2020-10-06 NOTE — NURSING
Pt arrived to unit with c/o of contractions. EFMx2 placed. Abdomen soft non-tender. SVE 5/60/-2 . POC reviewed with pt, verbalizes understanding.

## 2020-10-06 NOTE — LACTATION NOTE
10/06/20 0912   Maternal Assessment   Breast Density Bilateral:;soft   Areola Bilateral:;elastic   Nipples Bilateral:;everted   Maternal Infant Feeding   Maternal Emotional State independent;relaxed   Infant Positioning laid back (ventral)   Signs of Milk Transfer audible swallow;infant jaw motion present   Pain with Feeding no   Latch Assistance no   mother with baby skin to skin and rooting for breast -baby self attaches for strong sucking and swallows noted-mother denies discomfort with feeding -states having breast fed her other children for about 4 months -review basic breastfeeding information -given breastfeeding guide in [place] and [employer]  #[id number] used for all education -states understanding

## 2020-10-06 NOTE — ANESTHESIA PROCEDURE NOTES
CSE    Patient location during procedure: OB  Start time: 10/6/2020 5:13 AM  Timeout: 10/6/2020 5:12 AM  End time: 10/6/2020 5:15 AM    Staffing  Authorizing Provider: Lexie Christensen MD  Performing Provider: Lexie Christensen MD    Preanesthetic Checklist  Completed: patient identified, pre-op evaluation, timeout performed, IV checked, risks and benefits discussed and monitors and equipment checked  CSE  Patient position: sitting  Prep: ChloraPrep  Patient monitoring: heart rate, continuous pulse ox and frequent blood pressure checks  Approach: midline  Spinal Needle  Needle type: Huseyin   Needle gauge: 25 G  Needle length: 5 in  Epidural Needle  Injection technique: KAVON saline  Needle type: Tuohy   Needle gauge: 17 G  Needle length: 3.5 in  Needle insertion depth: 5 cm  Location: L4-5  Catheter  Catheter type: end hole  Catheter size: 19 G  Catheter at skin depth: 10 cm  Test dose: lidocaine 1.5% with Epi 1-to-200,000 and negative  Additional Documentation: incremental injection, negative aspiration for CSF, negative aspiration for heme, no paresthesia on injection and negative test dose  Assessment  Sensory level: T10   Dermatomal levels determined by pinch or prick  Intrathecal Medications:  administered: primary anesthetic mcg of

## 2020-10-06 NOTE — H&P
Obstetrics      SUBJECTIVE:     Jazlyn Martínez is a 34 y.o.  female at 39w4d admitted for labor management. Her current obstetrical history is significant for     Patient Active Problem List   Diagnosis    Pregnancy with one fetus    GBS (group B Streptococcus carrier), +RV culture, currently pregnant         PTA Medications   Medication Sig    prenatal no115-iron-folic acid (PRENATAL 19) 29 mg iron- 1 mg Chew Take 1 tablet by mouth once daily.    [DISCONTINUED] ibuprofen (ADVIL,MOTRIN) 600 MG tablet Take 1 tablet (600 mg total) by mouth every 6 (six) hours as needed (cramping).    [DISCONTINUED] oxyCODONE-acetaminophen (PERCOCET) 5-325 mg per tablet Take 1 tablet by mouth every 4 (four) hours as needed.       Review of patient's allergies indicates:  No Known Allergies     History reviewed. No pertinent past medical history.  History reviewed. No pertinent surgical history.  Family History   Problem Relation Age of Onset    Diabetes Mother      Social History     Tobacco Use    Smoking status: Never Smoker    Smokeless tobacco: Never Used   Substance Use Topics    Alcohol use: No    Drug use: No        OBJECTIVE:     Vital Signs (Most Recent)  Temp: 99.3 °F (37.4 °C) (10/06/20 0745)  Pulse: 94 (10/06/20 0815)  Resp: 18 (10/06/20 0715)  BP: 126/87 (10/06/20 0815)  SpO2: 99 % (10/06/20 0815)    Physical Exam:  General:  Normal, alert and oriented   Abdomen: Gravid no FT   Uterine Size:  c/w dates   FHT: cat1   Cervix:     Dilation: 9 cm    Effacement: 100%    Station:  0     AROM thin nec          Laboratory:  See results console  ASSESSMENT/PLAN:     39w4d gestation.  Active phase labor.   Conditions: GBBS Colonization on amp

## 2020-10-06 NOTE — NURSING
POC discussed with pt using [employer]  line.  ID # [id number]  All questions answered. Pt verbalized understanding.

## 2020-10-06 NOTE — PLAN OF CARE
VSS. Afebrile  Ambulating in room without difficulty.   FF @ 1 below umbilicus. Moderate rubra lochia. No clots. No odor.  Tolerating regular diet without any GI symptoms voiced.  Voiding without difficulty. Denies flatus. Denies BM.  Pain controlled with PRN meds.  Breastfeeding on demand with some assistance.   Appropriate maternal bonding noted.   Plan of care reviewed with patient utilizing [place] #[id number]. All questions answered.

## 2020-10-06 NOTE — NURSING
Dr Pickett notified of SVE and contraction pattern. Orders to admit. Will notify when pt is ready to deliver

## 2020-10-06 NOTE — ANESTHESIA PREPROCEDURE EVALUATION
10/06/2020  Jazlyn Martínez is a 34 y.o., female.    Anesthesia Evaluation    I have reviewed the Patient Summary Reports.    I have reviewed the Nursing Notes.    I have reviewed the Medications.     Review of Systems  Anesthesia Hx:  No problems with previous Anesthesia Denies Hx of Anesthetic complications  Neg history of prior surgery. Denies Family Hx of Anesthesia complications.   Denies Personal Hx of Anesthesia complications.   Social:  Non-Smoker, No Alcohol Use    Hematology/Oncology:  Hematology Normal   Oncology Normal     EENT/Dental:EENT/Dental Normal   Cardiovascular:  Cardiovascular Normal Exercise tolerance: good     Pulmonary:  Pulmonary Normal    Renal/:  Renal/ Normal     Hepatic/GI:  Hepatic/GI Normal    Musculoskeletal:  Musculoskeletal Normal    Neurological:  Neurology Normal    Endocrine:  Endocrine Normal    Dermatological:  Skin Normal    Psych:  Psychiatric Normal           Physical Exam  General:  Well nourished    Airway/Jaw/Neck:  Airway Findings: Mouth Opening: Normal General Airway Assessment: Adult  Mallampati: II  TM Distance: Normal, at least 6 cm         Dental:  DENTAL FINDINGS: Normal   Chest/Lungs:  Chest/Lungs Clear    Heart/Vascular:  Heart Findings: Normal Heart murmur: negative       Mental Status:  Mental Status Findings:  Cooperative, Alert and Oriented       Lab Results   Component Value Date    WBC 13.09 (H) 10/06/2020    HGB 12.0 10/06/2020    HCT 37.8 10/06/2020    MCV 76 (L) 10/06/2020     10/06/2020         Chemistry        Component Value Date/Time     10/06/2020 0400    K 4.1 10/06/2020 0400     10/06/2020 0400    CO2 20 (L) 10/06/2020 0400    BUN 9 10/06/2020 0400    CREATININE 0.6 10/06/2020 0400    GLU 85 10/06/2020 0400        Component Value Date/Time    CALCIUM 9.2 10/06/2020 0400    ALKPHOS 246 (H) 10/06/2020 0400     AST 14 10/06/2020 0400    ALT 10 10/06/2020 0400    BILITOT 0.1 10/06/2020 0400    ESTGFRAFRICA >60 10/06/2020 0400    EGFRNONAA >60 10/06/2020 0400            Anesthesia Plan  Type of Anesthesia, risks & benefits discussed:  Anesthesia Type:  CSE  Patient's Preference:   Intra-op Monitoring Plan: standard ASA monitors  Intra-op Monitoring Plan Comments:   Post Op Pain Control Plan:   Post Op Pain Control Plan Comments:   Induction:   IV  Beta Blocker:  Patient is not currently on a Beta-Blocker (No further documentation required).       Informed Consent: Patient understands risks and agrees with Anesthesia plan.  Questions answered. Anesthesia consent signed with patient.  ASA Score: 2     Day of Surgery Review of History & Physical:            Ready For Surgery From Anesthesia Perspective.

## 2020-10-07 VITALS
WEIGHT: 155 LBS | HEIGHT: 59 IN | OXYGEN SATURATION: 98 % | RESPIRATION RATE: 20 BRPM | DIASTOLIC BLOOD PRESSURE: 60 MMHG | HEART RATE: 98 BPM | BODY MASS INDEX: 31.25 KG/M2 | SYSTOLIC BLOOD PRESSURE: 106 MMHG | TEMPERATURE: 98 F

## 2020-10-07 LAB
BASOPHILS # BLD AUTO: 0.03 K/UL (ref 0–0.2)
BASOPHILS NFR BLD: 0.2 % (ref 0–1.9)
DIFFERENTIAL METHOD: ABNORMAL
EOSINOPHIL # BLD AUTO: 0.1 K/UL (ref 0–0.5)
EOSINOPHIL NFR BLD: 0.9 % (ref 0–8)
ERYTHROCYTE [DISTWIDTH] IN BLOOD BY AUTOMATED COUNT: 15.5 % (ref 11.5–14.5)
HCT VFR BLD AUTO: 32.2 % (ref 37–48.5)
HGB BLD-MCNC: 10.3 G/DL (ref 12–16)
IMM GRANULOCYTES # BLD AUTO: 0.11 K/UL (ref 0–0.04)
IMM GRANULOCYTES NFR BLD AUTO: 0.8 % (ref 0–0.5)
LYMPHOCYTES # BLD AUTO: 2.9 K/UL (ref 1–4.8)
LYMPHOCYTES NFR BLD: 21 % (ref 18–48)
MCH RBC QN AUTO: 24.2 PG (ref 27–31)
MCHC RBC AUTO-ENTMCNC: 32 G/DL (ref 32–36)
MCV RBC AUTO: 76 FL (ref 82–98)
MONOCYTES # BLD AUTO: 1 K/UL (ref 0.3–1)
MONOCYTES NFR BLD: 7 % (ref 4–15)
NEUTROPHILS # BLD AUTO: 9.8 K/UL (ref 1.8–7.7)
NEUTROPHILS NFR BLD: 70.1 % (ref 38–73)
NRBC BLD-RTO: 0 /100 WBC
PLATELET # BLD AUTO: 208 K/UL (ref 150–350)
PMV BLD AUTO: 12.3 FL (ref 9.2–12.9)
RBC # BLD AUTO: 4.25 M/UL (ref 4–5.4)
WBC # BLD AUTO: 13.98 K/UL (ref 3.9–12.7)

## 2020-10-07 PROCEDURE — 25000003 PHARM REV CODE 250: Performed by: OBSTETRICS & GYNECOLOGY

## 2020-10-07 PROCEDURE — 85025 COMPLETE CBC W/AUTO DIFF WBC: CPT

## 2020-10-07 PROCEDURE — 36415 COLL VENOUS BLD VENIPUNCTURE: CPT

## 2020-10-07 RX ORDER — IBUPROFEN 600 MG/1
600 TABLET ORAL EVERY 6 HOURS PRN
Qty: 60 TABLET | Refills: 1 | Status: SHIPPED | OUTPATIENT
Start: 2020-10-07

## 2020-10-07 RX ORDER — ACETAMINOPHEN AND CODEINE PHOSPHATE 120; 12 MG/5ML; MG/5ML
1 SOLUTION ORAL DAILY
Qty: 28 TABLET | Refills: 11 | Status: SHIPPED | OUTPATIENT
Start: 2020-10-07 | End: 2021-10-07

## 2020-10-07 RX ADMIN — IBUPROFEN 600 MG: 600 TABLET, FILM COATED ORAL at 10:10

## 2020-10-07 RX ADMIN — IBUPROFEN 600 MG: 600 TABLET, FILM COATED ORAL at 12:10

## 2020-10-07 NOTE — NURSING
POC reviewed with patient via language line  ID# [id number]. All questions answered. Will cont. To monitor.

## 2020-10-07 NOTE — PLAN OF CARE
Pt. Pain managed with prn medications. Tolerating diet, no c/o n/v.  Enc. Pt. To ambulate, pt ambulate in the room w/o difficulty. Voiding w/o difficultly. Light lochia. Tucks and dermoplast @ the bedside. Breast feeding infant w/ assist as needed. I&o today. Cbc in the morning. poc reviewed with pt. And s.o. via [place] language line . All questions answered. Bonding well with infant. Vss.

## 2020-10-07 NOTE — LACTATION NOTE
"This note was copied from a baby's chart.  [employer]  # [id number].  Both parents present for breastfeeding DC instructions.  Breastfeeding discharge instructions given with review of Mother's Breastfeeding Guide and Resource List.  Discussed jaundice and need to feed on cue, at least q 3 hours around the clock.  Encouraged to call hotline # prn.  States "understand" and verbalized appropriate recall.    "

## 2020-10-07 NOTE — LACTATION NOTE
10/07/20 0900   Pain/Comfort/Sleep   Pain Body Location - Side Bilateral   Pain Body Location breast   Pain Rating (0-10): Activity 0   Breasts WDL   Breast WDL WDL   Maternal Feeding Assessment   Maternal Emotional State relaxed;independent   Infant Positioning cradle   Signs of Milk Transfer audible swallow;infant jaw motion present   Latch Assistance no   Reproductive Interventions   Breastfeeding Assistance infant latch-on verified;infant suck/swallow verified   Breastfeeding Support encouragement provided;lactation counseling provided     [employer]  # [id number].  Independently latched to left breast in cradle hold; audible swallows noted.  Reviewed breastfeeding basics.  Encouraged to call for assist prn.  Referred to [facility] for insurance provided breastpump.  Prescription in EPIC and written instructiosn given.

## 2020-10-07 NOTE — DISCHARGE SUMMARY
34 y.o.   OB History        5    Para   4    Term   4       0    AB   1    Living   4       SAB   1    TAB   0    Ectopic   0    Multiple   0    Live Births   4               admitted for Delivery. See procedure note. Postpartum course was unremarkable.     Admit date 10/6/2020  3:50 AM  D/c date 10/07/2020    Disposition: Home  Activity 6 weeks pelvic rest  Diet: normal  Discharge followup 1 week, if . Follow up in 6 weeks if vaginal delivery  Meds listed separately

## 2020-10-07 NOTE — DISCHARGE INSTRUCTIONS
After a Vaginal Birth    General Discharge Instructions    · May follow a regular diet, unless otherwise discussed with physician.    · Take showers, not baths unless otherwise discussed with physician.    · Activity as tolerated.    · No lifting or heavy exercise for 6 weeks, no driving for 2 weeks, no sexual intercourse, douching or tampons for 6 weeks    · May return to work/school as discussed with physician  · Take medications as directed    · Discuss birth control with physician    · Breast care support bra worn at all times    · Lactation consultant referral number ( [phone] or [phone])    Call Your Healthcare Provider Right Away If You Have:  · A temperature of 100.4°F or higher.  · If your blood pressure is over 155/105.  · You have difficulty catching your breath or trouble breathing.  · Heavy vaginal bleeding, clots, or vaginal discharge with foul odor. (heavier than menses)  · Persistent nausea or vomiting.  · You gain more than 3 pounds in 3 days.  · Severe headaches not relieved by Tylenol (acetaminophen) or Motrin (ibuprofen)  · Blurry or double vision, see spots or flashing lights.  · Dizziness or fainting.  · New onset swelling or worsening of existing swelling.  · Burning or pain when you urinate.  · No bowel movement for 5 days.  · Redness, warmth, swelling, or pain in the lower leg.  · Redness, discharge, or pain worse than you had in the hospital.  · Burning, pain, red streaks, or lumpy areas in your breasts.  · Cracks, blisters, or blood on your nipples.  · Feelings of extreme sadness or anxiety, or a feeling that you dont want to be with your baby.  · If you have any new or unusual symptoms or have questions or concerns    Some of these symptoms can occur up to 4 to 6 weeks after delivery. This can be a sign of pre-eclampsia, which is a serious disease that can cause stroke, seizures, organ damage, or death. Do not wait to call your doctor or seek medical attention.            If  You Had Stitches  You may have received stitches in the skin near your vagina. The stitches might have closed an episiotomy (an incision that enlarges the opening of the vagina). Or you may have needed stitches to repair torn skin. Either way, your stitches should dissolve within weeks. Until then, you can help reduce discomfort, aid healing, and reduce your risk of infection by keeping the stitches clean. These tips can help:    · Gently wipe from front to back after you urinate or have a bowel movement.  · After wiping, spray warm water on the area. Or you can have a sitz bath. This means sitting in a tub with a few inches of water in it. Then pat the area dry or use a hairdryer on a cool setting.  · You can take a shower instead of a bath.  · Change sanitary pads at least every 2-4 hours.  · Place cold or heat packs on the area as directed by your doctors or nurses. Keep a thin towel between the pack and your skin.  · Sit on firm seats so the stitches pull less.     Follow-Up  Schedule a  follow-up exam with your healthcare provider for about 6 weeks after delivery. During this exam, your uterus and vaginal area will be checked. Contact your healthcare provider if you think you or your baby are having any problems.         Breastfeeding Discharge Instructions      AAP recommendation of exclusive breastfeeding for the first 6 months of life and continued breastfeeding with the introduction of supplemental foods beyond the first year of life and recommends to delay all bottle and pacifier use until after 4 weeks of age and breastfeeding is well established.  Discussed the benefits of exclusive breastfeeding for both mother and baby.  Discussed the risks of supplementation/pacifier use on the exclusivity of breastfeeding in the first 6 months. Feed the baby at the earliest sign of hunger or comfort  o Hands to mouth, sucking motions  o Rooting or searching for something to suck on  o Dont wait  for crying - it is a not a late sign of hunger; it is a sign of distress     The feedings may be 8-12 times per 24hrs and will not follow a schedule   Alternate the breast you start the feeding with, or start with the breast that feels the fullest   Switch breasts when the baby takes himself off the breast or falls asleep   Keep offering breasts until the baby looks full, no longer gives hunger signs, and stays asleep when placed on his back in the crib   If the baby is sleepy and wont wake for a feeding, put the baby skin-to-skin dressed in a diaper against the mothers bare chest   Sleep near your baby   The baby should be positioned and latched on to the breast correctly  o Chest-to-chest, chin in the breast  o Babys lips are flipped outward  o Babys mouth is stretched open wide like a shout  o Babys sucking should feel like tugging to the mother  - The baby should be drinking at the breast:  o You should hear swallowing or gulping throughout the feeding  o You should see milk on the babys lips when he comes off the breast  o Your breasts should be softer when the baby is finished feeding  o The baby should look relaxed at the end of feedings  o After the 4th day and your milk is in:  o The babys poop should turn bright yellow and be loose, watery, and seedy  o The baby should have at least 3-4 poops the size of the palm of your hand per day  o The baby should have at least 6-8 wet diapers per day  o The urine should be light yellow in color  You should drink when you are thirsty and eat a healthy diet when you are    hungry.     Take naps to get the rest you need.   Take medications and/or drink alcohol only with permission of your obstetrician    or the babys pediatrician.  You can also call the [facility],   ([phone]), Monday-Friday, 8am-5pm Central time, to get the most   up-to-date evidence-based information on the use of medications during   pregnancy and breastfeeding.       The baby should be examined by a pediatrician at 3-5 days of age; unless ordered sooner by the pediatrician.  Once your milk comes in, the baby should be back to birth weight no later than 10-14 days of age.    Primary Engorgement    If the milk is flowing, use wet or dry heat applied to the breasts for approximately 10min prior to each feeding as a comfort measure to facilitate the milk ejection reflex    Follow heat treatment with breast massage to soften hard/lumpy areas of the breast    Use unrestricted, frequent, effective feedings.      Wake baby to feed if necessary    Avoid pacifier and bottle feedings    Hand express or pump breasts to the point of comfort prn    Use cold treatments in the form of ice packs/gel packs/ frozen vegetables wrapped in a soft thin cloth and applied to the breasts for approximately 20min after each feeding until engorgement is resolved    Wear comfortable, supportive bra    Take pain medicine prn    Use anti-inflammatory medications if prescribed by physician    Other:    [place] Pumping Instructions :    Preparation and Hygiene:    1. Shower daily.  2. Wear a clean bra each day and wash daily in warm soapy water.  3. Change wet or moist breast pads frequently.  Moist pads can promote growth of germs.  4. Actively wash your hands, paying close attention to the area around and under your fingernails, thoroughly with soap and water for 15 seconds before pumping or handling your milk.  Re-wash your hands if you touch anything (scratching your nose, answering the phone, etc) while pumping or handling your milk.   5. Before pumping your breasts, assemble the pump collection kit and have ready the sterile container and labels.  Place these items on a clean surface next to the breastpump.  6. Each time after you have finished pumping, take apart all of the parts of the breastpump collection kit and place them in a separate cleaning container (do not place them in the sink).  Be sure  to remove the yellow valve from the breastshield and separate the white membrane from the yellow valve.  Rinse all of these parts with cool water.  Then use a new sponge and/or bottle brush and dishwashing detergent to clean the parts.  Rinse off the soapy water with cool water and air dry on a clean towel covered with a clean cloth.  All parts may also be washed after each use in the top rack of a .  7. Once each day, sterilize all of the parts of the breastpump collection kit.  This can be done by boiling the kit parts for 10 minutes or by using a Quick Clean Micro-Steam Bag made by [employer]  8. If condensation appears in the tubing, continue to run the pump with the tubing attached for 1-2 minutes or until the tubing is dry.   9. Notify your babys nurse or doctor if you become ill or need to take any medication, even over-the-counter medicines.        Collection and Storage of Expressed Breastmilk:         1. Pump your breasts at least 8-10 times every 24 hours.  Double pump (both breasts at  the same time) for at least 15-20 minutes using the most suction that is comfortable.    2. Write the date and time of pumping and the name of any medications you are takingon the babys pre-printed hospital identification label.   3.    Do not touch the inside of the storage containers or lids.      4.        Tightly screw the lid onto the container and place immediately into the                                refrigerator for daily use.  Bottle may remain at room temperature if the next                    feeding is within 4 hours.  5.    Expressed breastmilk should be refrigerated or frozen within 4 hours of                pumping.  6.        Do not store expressed breastmilk on the door of your refrigerator or freeze             where the temperature is warmer.   7.        Refrigerated milk may be stored in for up to 7 days.  At this point it can be              moved to the freezer for 6 -12 months.  8.         Thaw frozen breast milk in overnight in the refrigerator.  Once milk is thawed it              must be used within 24 hours.  9.        Refrigerated breast milk needs to be warmed to room temperature.  Warm by leaving unrefrigerated until it reached room temperature, or place sealed bottle into a cup of warm (not boiling) water or use a bottle warmer.               Never warm breast milk in a microwave or boiling water.    For any questions or concerns call:  Lactation Department at [phone]

## 2020-10-07 NOTE — PROGRESS NOTES
PPD#1  Doing well, no complaints. Denies h/a, vision changes, upper abd pain, chest pain, sob.    Patient Active Problem List   Diagnosis    Pregnancy with one fetus    GBS (group B Streptococcus carrier), +RV culture, currently pregnant       Temp:  [97.4 °F (36.3 °C)-99.3 °F (37.4 °C)] 97.8 °F (36.6 °C)  Pulse:  [] 76  Resp:  [17-20] 18  SpO2:  [96 %-99 %] 97 %  BP: (102-135)/(66-93) 102/67  Alert and oriented  Lungs: Normal respiratory effort.  Cv: RRR  Abd soft fundus firm and nontender  Ext: No significant asymmetric edema, no calf tenderness.    Recent Labs   Lab 10/06/20  0400 10/07/20  0557   WBC 13.09* 13.98*   HGB 12.0 10.3*   HCT 37.8 32.2*    208   GROUPTRH B POS  --        A&P  34 y.o.  Post partum, doing well  Routine postpartum care  Want to go home today. Maria Dolores translated

## 2020-10-09 ENCOUNTER — TELEPHONE (OUTPATIENT)
Dept: OBSTETRICS AND GYNECOLOGY | Facility: HOSPITAL | Age: 35
End: 2020-10-09

## 2020-10-12 ENCOUNTER — TELEPHONE (OUTPATIENT)
Dept: OBSTETRICS AND GYNECOLOGY | Facility: HOSPITAL | Age: 35
End: 2020-10-12

## 2020-10-12 NOTE — TELEPHONE ENCOUNTER
Spoke to pt who states baby breastfeeding well every 2 hours and having dirty diapers with every feeding -worried baby having too many dirty diapers-reassured normal and means baby getting plenty to eat -saw pediatrician and baby gaining weight -denies any problems with breasts -asking about getting pump through [facility] and will follow-up with her due to language barrier

## 2020-10-14 NOTE — ANESTHESIA POSTPROCEDURE EVALUATION
Anesthesia Post Evaluation    Patient: Jazlyn Martínez    Procedure(s) Performed: * No procedures listed *    Final Anesthesia Type: CSE    Patient location during evaluation: labor & delivery  Patient participation: Yes- Able to Participate  Level of consciousness: awake and alert, oriented and awake  Post-procedure vital signs: reviewed and stable  Airway patency: patent    PONV status at discharge: No PONV  Anesthetic complications: no      Cardiovascular status: blood pressure returned to baseline  Respiratory status: unassisted, spontaneous ventilation and room air  Hydration status: euvolemic  Follow-up not needed.        No case tracking events are documented in the log.      Pain/Rosa Score: No data recorded

## 2022-08-22 NOTE — TELEPHONE ENCOUNTER
CM sent transportation request to [address] [place] [address] for pt dc Tuesday 8/23/22 to her apartment [address] Spoke to pt who states baby is breastfeeding well and having at least 4 mustard yellow colored dirty dippers and sometimes more -breasts are full and baby softens them well -denies any breast or nipple pain-using the lanolin she got in the hospital and it works  -saw Dr Dodson and baby was doing well she said -has no questions or concerns for us
